# Patient Record
Sex: MALE | Race: WHITE | NOT HISPANIC OR LATINO | Employment: FULL TIME | URBAN - METROPOLITAN AREA
[De-identification: names, ages, dates, MRNs, and addresses within clinical notes are randomized per-mention and may not be internally consistent; named-entity substitution may affect disease eponyms.]

---

## 2017-01-10 ENCOUNTER — OFFICE VISIT (OUTPATIENT)
Dept: FAMILY MEDICINE | Facility: CLINIC | Age: 47
End: 2017-01-10
Payer: COMMERCIAL

## 2017-01-10 VITALS
WEIGHT: 200 LBS | DIASTOLIC BLOOD PRESSURE: 68 MMHG | OXYGEN SATURATION: 99 % | BODY MASS INDEX: 29.62 KG/M2 | RESPIRATION RATE: 18 BRPM | HEART RATE: 82 BPM | HEIGHT: 69 IN | TEMPERATURE: 98.8 F | SYSTOLIC BLOOD PRESSURE: 128 MMHG

## 2017-01-10 DIAGNOSIS — M75.22 BICEPS TENDINITIS OF LEFT SHOULDER: Primary | ICD-10-CM

## 2017-01-10 PROCEDURE — 99213 OFFICE O/P EST LOW 20 MIN: CPT | Performed by: FAMILY MEDICINE

## 2017-01-10 ASSESSMENT — PAIN SCALES - GENERAL: PAINLEVEL: MODERATE PAIN (5)

## 2017-01-10 NOTE — PROGRESS NOTES
"  SUBJECTIVE:                                                    Serafin Hurd is a 46 year old male who presents to clinic today for the following health issues:      Musculoskeletal problem/pain      Duration: 1 week     Description  Location: left shoulder     Intensity:  5 to a 8 /10    Accompanying signs and symptoms: none    History  Previous similar problem: no   Previous evaluation:  Chiropractor     Precipitating or alleviating factors:  Trauma or overuse: no   Aggravating factors include: hanging the arm down     Therapies tried and outcome: rest/inactivity, heat, deep heating rub and chiropractor.        One week ago, he got out of bed and had extreme pain in his left shoulder and down the upper arm. He did go to the chiropractor 3 times, and it has not gotten better, only now it is isolated to the shoulder. The chiropractor recommended he see a physician     No prior injury noted - hurts more at rest then with movement     The first day was the worst, but, since then it has remained the same     ROS:  General: negative, fever, chills    Review of systems assessed and negative except as stated above.    OBJECTIVE:                                                    /68 mmHg  Pulse 82  Temp(Src) 98.8  F (37.1  C)  Resp 18  Ht 5' 9\" (1.753 m)  Wt 200 lb (90.719 kg)  BMI 29.52 kg/m2  SpO2 99%  Body mass index is 29.52 kg/(m^2).  GENERAL APPEARANCE: healthy, alert and no distress  ORTHO:   SHOULDER Exam-Left   Inspection: no swelling, no bruising, no discoloration, no obvious deformity, no asymmetry, no glenohumeral joint anterior bulge, no distal clavicle elevation, no muscle atrophy, no scapular winging   Tenderness of: SC joint- no , clavicle(prox-mid)- no , clavicle-(mid-distal)- no , AC joint- no , acromion- no , anterior capsule- no , prox bicep tendon- YES, greater tuberosity- no , prox humerus- no , supraspinatous- YES, infraspinatous-  superior trapezious- no , rhomboids- no    Range of " Motion: Active- forward flexion- normal, abduction- normal, external rotation- normal, internal rotation- normal  Range of Motion: Passive- forward flexion- normal, abduction- normal, external rotation- normal, internal rotation- normal   Strength: forward flexion- 5/5, abduction- 5/5, internal rotation- 5/5, external rotation- 5/5 and bicep- full   Special tests: [place]ro[place](supraspinatous)- POSITIVE                ASSESSMENT/PLAN:                                                        ICD-10-CM    1. Biceps tendinitis of left shoulder M75.22      PLAN:  1. Aleve 2 pills (about 500 mg) twice a day for 10-14 days  2. If no improvement or getting worse, consider steroid injection and/or physical therapy.    Follow up with Provider - as needed     This document serves as a record of the services and decisions personally performed and made by Tanner Oneal MD.It was created on his behalf by Ana Laura Montejo,  trained medical scribes. The creation of this document is based the provider's statements to the medical scribe. January 10, 2017 10:53 AM    The information in this document, created by the medical scribe for me, accurately reflects the services I personally performed and the decisions made by me. I have reviewed and approved this document for accuracy.     Tanner Oneal MD   [facility]

## 2017-01-10 NOTE — NURSING NOTE
"Chief Complaint   Patient presents with     Shoulder Pain     seen chiropractor x3 not better. Left shoulder, no injury. Thinks he might need steroid.        Initial /68 mmHg  Pulse 82  Temp(Src) 98.8  F (37.1  C)  Resp 18  Ht 5' 9\" (1.753 m)  Wt 200 lb (90.719 kg)  BMI 29.52 kg/m2  SpO2 99% Estimated body mass index is 29.52 kg/(m^2) as calculated from the following:    Height as of this encounter: 5' 9\" (1.753 m).    Weight as of this encounter: 200 lb (90.719 kg).  BP completed using cuff size: regular    "

## 2017-01-10 NOTE — MR AVS SNAPSHOT
"              After Visit Summary   1/10/2017    Serafin Hurd    MRN: [id number]           Patient Information     Date Of Birth          1970        Visit Information        Provider Department      1/10/2017 10:30 AM Tanner Oneal MD [facility]         Follow-ups after your visit        Who to contact     If you have questions or need follow up information about today's clinic visit or your schedule please contact [facility] directly at [phone].  Normal or non-critical lab and imaging results will be communicated to you by MyChart, letter or phone within 4 business days after the clinic has received the results. If you do not hear from us within 7 days, please contact the clinic through [employer] or phone. If you have a critical or abnormal lab result, we will notify you by phone as soon as possible.  Submit refill requests through iodine or call your pharmacy and they will forward the refill request to us. Please allow 3 business days for your refill to be completed.          Additional Information About Your Visit        [facility] Information     iodine lets you send messages to your doctor, view your test results, renew your prescriptions, schedule appointments and more. To sign up, go to [email or web address] . Click on \"Log in\" on the left side of the screen, which will take you to the Welcome page. Then click on \"Sign up Now\" on the right side of the page.     You will be asked to enter the access code listed below, as well as some personal information. Please follow the directions to create your username and password.     Your access code is: [email or web address]  Expires: 2017  9:35 AM     Your access code will  in 90 days. If you need help or a new code, please call your [facility] or [phone].        Care EveryWhere ID     This is your Care EveryWhere ID. This could be used by other organizations to access your [place] medical " "records  [id number]        Your Vitals Were     Pulse Temperature Respirations Height BMI (Body Mass Index) Pulse Oximetry    82 98.8  F (37.1  C) 18 5' 9\" (1.753 m) 29.52 kg/m2 99%       Blood Pressure from Last 3 Encounters:   01/10/17 128/68   11/22/16 124/80   11/08/16 130/80    Weight from Last 3 Encounters:   01/10/17 200 lb (90.719 kg)   11/22/16 192 lb (87.091 kg)   11/08/16 194 lb (87.998 kg)              Today, you had the following     No orders found for display         Today's Medication Changes          These changes are accurate as of: 1/10/17  4:23 PM.  If you have any questions, ask your nurse or doctor.               Stop taking these medicines if you haven't already. Please contact your care team if you have questions.     amitriptyline 10 MG tablet   Commonly known as:  ELAVIL   Stopped by:  Tanner Oneal MD                    Primary Care Provider Office Phone # Fax #    Beto Scott Ramos  [phone]       [address]   [place] [address]        Thank you!     Thank you for choosing [facility]  for your care. Our goal is always to provide you with excellent care. Hearing back from our patients is one way we can continue to improve our services. Please take a few minutes to complete the written survey that you may receive in the mail after your visit with us. Thank you!             Your Updated Medication List - Protect others around you: Learn how to safely use, store and throw away your medicines at www.disposemymeds.org.          This list is accurate as of: 1/10/17  4:23 PM.  Always use your most recent med list.                   Brand Name Dispense Instructions for use    XALATAN 0.005 % ophthalmic solution   Generic drug:  latanoprost      1 DROP EACH EYE DAILY         "

## 2017-01-10 NOTE — Clinical Note
[address]   [address]  Tel. [phone] / Fax [phone]     January 10, 2017    To Whom it may Concern:    Serafin Hurd was seen today in clinic.  He has a severe ankle sprain and will not be allowed to be weight bearing for next 2-3 days and then can be weight bearing and participate in activity as pain allows and as appropriate.  He will have follow-up next week to assess further plan of care.      If you have any further questions, please contact me at the number above.    Sincerely,        Tanner Oneal MD

## 2017-10-17 ENCOUNTER — OFFICE VISIT (OUTPATIENT)
Dept: FAMILY MEDICINE | Facility: OTHER | Age: 47
End: 2017-10-17
Payer: COMMERCIAL

## 2017-10-17 DIAGNOSIS — M79.671 RIGHT FOOT PAIN: Primary | ICD-10-CM

## 2017-10-17 PROCEDURE — 99213 OFFICE O/P EST LOW 20 MIN: CPT | Performed by: INTERNAL MEDICINE

## 2017-10-17 RX ORDER — MELOXICAM 15 MG/1
15 TABLET ORAL DAILY
Qty: 28 TABLET | Refills: 0 | Status: SHIPPED | OUTPATIENT
Start: 2017-10-17 | End: 2018-06-18

## 2017-10-17 ASSESSMENT — PAIN SCALES - GENERAL: PAINLEVEL: MODERATE PAIN (5)

## 2017-10-17 NOTE — NURSING NOTE
"Chief Complaint   Patient presents with     Musculoskeletal Problem     left foot pain x 1 week       Initial /88  Pulse 74  Temp 97.6  F (36.4  C) (Temporal)  Resp 16  Wt 192 lb (87.1 kg)  SpO2 97%  BMI 28.35 kg/m2 Estimated body mass index is 28.35 kg/(m^2) as calculated from the following:    Height as of 1/10/17: 5' 9\" (1.753 m).    Weight as of this encounter: 192 lb (87.1 kg).  Medication Reconciliation: complete  Nathaly Rodrigues CMA (AAMA)    "

## 2017-10-17 NOTE — MR AVS SNAPSHOT
"              After Visit Summary   10/17/2017    Serafin Hurd    MRN: [id number]           Patient Information     Date Of Birth          1970        Visit Information        Provider Department      10/17/2017 2:40 PM Beto Ramos DO [facility]        Today's Diagnoses     Right foot pain    -  1       Follow-ups after your visit        Who to contact     If you have questions or need follow up information about today's clinic visit or your schedule please contact [facility] directly at [phone].  Normal or non-critical lab and imaging results will be communicated to you by [employer]hart, letter or phone within 4 business days after the clinic has received the results. If you do not hear from us within 7 days, please contact the clinic through [employer] or phone. If you have a critical or abnormal lab result, we will notify you by phone as soon as possible.  Submit refill requests through [employer] App or call your pharmacy and they will forward the refill request to us. Please allow 3 business days for your refill to be completed.          Additional Information About Your Visit        MyChart Information     Carista App lets you send messages to your doctor, view your test results, renew your prescriptions, schedule appointments and more. To sign up, go to [email or web address] . Click on \"Log in\" on the left side of the screen, which will take you to the Welcome page. Then click on \"Sign up Now\" on the right side of the page.     You will be asked to enter the access code listed below, as well as some personal information. Please follow the directions to create your username and password.     Your access code is: [email or web address]  Expires: 2018  9:48 AM     Your access code will  in 90 days. If you need help or a new code, please call your [facility] or [phone].        Care EveryWhere ID     This is your Care EveryWhere ID. This could be used by other " organizations to access your [place] medical records  [id number]        Your Vitals Were     Pulse Temperature Respirations Pulse Oximetry BMI (Body Mass Index)       74 97.6  F (36.4  C) (Temporal) 16 97% 28.35 kg/m2        Blood Pressure from Last 3 Encounters:   10/17/17 138/88   01/10/17 128/68   11/22/16 124/80    Weight from Last 3 Encounters:   10/17/17 192 lb (87.1 kg)   01/10/17 200 lb (90.7 kg)   11/22/16 192 lb (87.1 kg)              Today, you had the following     No orders found for display         Today's Medication Changes          These changes are accurate as of: 10/17/17 11:59 PM.  If you have any questions, ask your nurse or doctor.               Start taking these medicines.        Dose/Directions    meloxicam 15 MG tablet   Commonly known as:  MOBIC   Used for:  Right foot pain   Started by:  Beto Ramos DO        Dose:  15 mg   Take 1 tablet (15 mg) by mouth daily   Quantity:  28 tablet   Refills:  0            Where to get your medicines      These medications were sent to [address]  [address], [facility] [address]     Phone:  [phone]     meloxicam 15 MG tablet                Primary Care Provider Office Phone # Fax #    Beto Ramos [phone] [address] [facility]   [facility] [address]        Equal Access to Services     CHUCHO BALDWIN AH: Hadii deja ku hadasho Solissyali, waaxda luqadaha, qaybta kaalmada adeegyada, edwin blue. So [facility] [phone].    ATENCIÓN: Si habla español, tiene a thomason disposición servicios gratuitos de asistencia lingüística. Llame al [phone].    We comply with applicable federal civil rights laws and [place] laws. We do not discriminate on the basis of race, color, national origin, age, disability, sex, sexual orientation, or gender identity.            Thank you!     Thank you for choosing [facility]  for your care. Our goal is  always to provide you with excellent care. Hearing back from our patients is one way we can continue to improve our services. Please take a few minutes to complete the written survey that you may receive in the mail after your visit with us. Thank you!             Your Updated Medication List - Protect others around you: Learn how to safely use, store and throw away your medicines at www.disposemymeds.org.          This list is accurate as of: 10/17/17 11:59 PM.  Always use your most recent med list.                   Brand Name Dispense Instructions for use Diagnosis    meloxicam 15 MG tablet    MOBIC    28 tablet    Take 1 tablet (15 mg) by mouth daily    Right foot pain       XALATAN 0.005 % ophthalmic solution   Generic drug:  latanoprost      1 DROP EACH EYE DAILY

## 2017-10-17 NOTE — PROGRESS NOTES
SUBJECTIVE:   Serafin Hurd is a 46 year old male who presents to clinic today for the following health issues:      Foot Pain    Onset: x 1 week    Description:   Location: left foot  Character: Sharp, Dull ache, Stabbing and Cramping    Intensity: moderate    Progression of Symptoms: better    Accompanying Signs & Symptoms:  Other symptoms: none    History:   Previous similar pain: YES      Precipitating factors:   Trauma or overuse: no     Alleviating factors:  Improved by: sole inserts    Therapies Tried and outcome: sole inserts    Chief complaint: Left foot pain    HPI:  Serafin is a pleasant 46 year old male who presents to the clinic with 7 days history of pain in his left foot. Pain began last Tuesday (10/10/2017) upon awakening. He rated the pain as a 4-5/10 and thought it was a flare up of a fascitis. He switched to wearing athletic/tennis shoes after 24 hours as he thought his dress shoes may have been the cause. 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 Friday (10/13) he had to participate in a high school football game after working the whole day, this caused the pain to go to a 7/10. He said that he had trouble walking on the foot throughout the weekend. He has not tried any OTC analgesic medications or other home remedies. He has been tying his shoes tighter than normal as he feels this has helped his pain.     Past Medical History:   Diagnosis Date     Hypertension      Unspecified glaucoma(365.9) 1998      Past Surgical History:   Procedure Laterality Date     REMOVAL OF SPERM DUCT(S)      Vasectomy      Family History   Problem Relation Age of Onset     Depression Mother      C.A.D. Paternal Uncle       Social History   Substance Use Topics     Smoking status: Never Smoker     Smokeless tobacco: Former User     Alcohol use 0.0 oz/week     0 Standard drinks or equivalent per week      Comment: Social           Review of Systems:    ENT: Pt denies sore  throat, significant nasal congestion, epistaxis, difficulty swallowing, or changes in base-line hearing.    LUNGS: Pt denies cough, excess sputum, hemoptysis, or shortness of breath.   NEURO: Pt denies seizures, strokes, diplopia, weakness, paraesthesias, or paralysis.    SKIN: Pt denies itching, rashes, discoloration, or specific lesions of concern.   PSYCH: Pt denies significant depression, anxiety, mood imbalance. Specifically denies any suicidal ideation.    MS: Pt denies significant joint pain, swelling, or acute loss of function. Able to ambulate with little or no assistance. Pain in left foot for past 7 days.     Examination:  B/P: 138/88 T:97.6 P:74 R:16 [unfilled] 192 lbs 0 oz Data Unavailable     Constitutional: The patient appears to be in no acute distress. The patient appears to be adequately hydrated. No acute respiratory or hemodynamic distress is noted at this time.   ENT: Face is symmetric. Nasal mucosa normal without mass, lesion or bleed.    LUNGS: clear bilaterally. No coughing in noted during visit.    NEURO: PT is alert and appropriate. No neurologic lateralization is noted. Cranial nerves 2-12 are intact. Peripheral sensory and motor function are grossly normal.   PSYCH: The patient appears grossly appropriate. Maintain good eye contact, does not have any jittery or atypical motion. Displays appropriate affect.    SKIN: warm and dry. No erythema, or rashes are noted. No specific lesions of concern are noted.    MS: Minimal crepitance is noted in the extremities. No deformity is present. Muscle strength is appropriate and equal bilaterally. No acute joint erythema or swelling is present. Tenderness to palpation over dorsal surface of 2nd/3rd metatarsal.        Decision Makin. Right foot pain  Serafin works as a  for [employer], this job requires him to be on his feet for extended periods of time. Per his history and physical exam his pain is likely the result of an inflamed  nerve exacerbated by standing. We discussed the low probability of fracture and recommended use of an anti-inflammatory medication. Reviewed mechanism of action and possible adverse effects. Serafin was in agreement with this plan.  - meloxicam (MOBIC) 15 MG tablet; Take 1 tablet (15 mg) by mouth daily  Dispense: 28 tablet; Refill: 0      Follow up:    I have asked the patient to schedule a follow up appointment with me as needed or sooner if conditions worsen, change or fail to improve.     I have carefully explained the diagnosis and treatment options with the patient. The patient has displayed an understanding of the above, and all subsequent questions were answered.     Ean HUERTA

## 2017-10-18 VITALS
SYSTOLIC BLOOD PRESSURE: 138 MMHG | DIASTOLIC BLOOD PRESSURE: 88 MMHG | BODY MASS INDEX: 28.35 KG/M2 | WEIGHT: 192 LBS | OXYGEN SATURATION: 97 % | RESPIRATION RATE: 16 BRPM | TEMPERATURE: 97.6 F | HEART RATE: 74 BPM

## 2018-06-18 ENCOUNTER — OFFICE VISIT (OUTPATIENT)
Dept: FAMILY MEDICINE | Facility: OTHER | Age: 48
End: 2018-06-18
Payer: COMMERCIAL

## 2018-06-18 VITALS
HEART RATE: 80 BPM | BODY MASS INDEX: 28.56 KG/M2 | RESPIRATION RATE: 16 BRPM | TEMPERATURE: 96.3 F | HEIGHT: 69 IN | OXYGEN SATURATION: 99 % | WEIGHT: 192.8 LBS | SYSTOLIC BLOOD PRESSURE: 136 MMHG | DIASTOLIC BLOOD PRESSURE: 80 MMHG

## 2018-06-18 DIAGNOSIS — Z00.00 ENCOUNTER FOR ROUTINE ADULT HEALTH EXAMINATION WITHOUT ABNORMAL FINDINGS: Primary | ICD-10-CM

## 2018-06-18 DIAGNOSIS — I10 HYPERTENSION GOAL BP (BLOOD PRESSURE) < 140/90: ICD-10-CM

## 2018-06-18 LAB
ALBUMIN SERPL-MCNC: 4.2 G/DL (ref 3.4–5)
ALBUMIN UR-MCNC: NEGATIVE MG/DL
ALP SERPL-CCNC: 56 U/L (ref 40–150)
ALT SERPL W P-5'-P-CCNC: 47 U/L (ref 0–70)
ANION GAP SERPL CALCULATED.3IONS-SCNC: 8 MMOL/L (ref 3–14)
APPEARANCE UR: CLEAR
AST SERPL W P-5'-P-CCNC: 31 U/L (ref 0–45)
BILIRUB SERPL-MCNC: 0.7 MG/DL (ref 0.2–1.3)
BILIRUB UR QL STRIP: NEGATIVE
BUN SERPL-MCNC: 10 MG/DL (ref 7–30)
CALCIUM SERPL-MCNC: 8.6 MG/DL (ref 8.5–10.1)
CHLORIDE SERPL-SCNC: 103 MMOL/L (ref 94–109)
CHOLEST SERPL-MCNC: 184 MG/DL
CO2 SERPL-SCNC: 30 MMOL/L (ref 20–32)
COLOR UR AUTO: YELLOW
CREAT SERPL-MCNC: 0.88 MG/DL (ref 0.66–1.25)
ERYTHROCYTE [DISTWIDTH] IN BLOOD BY AUTOMATED COUNT: 12.3 % (ref 10–15)
GFR SERPL CREATININE-BSD FRML MDRD: >90 ML/MIN/1.7M2
GLUCOSE SERPL-MCNC: 106 MG/DL (ref 70–99)
GLUCOSE UR STRIP-MCNC: NEGATIVE MG/DL
HCT VFR BLD AUTO: 44.8 % (ref 40–53)
HDLC SERPL-MCNC: 52 MG/DL
HGB BLD-MCNC: 16.2 G/DL (ref 13.3–17.7)
HGB UR QL STRIP: NEGATIVE
KETONES UR STRIP-MCNC: NEGATIVE MG/DL
LDLC SERPL CALC-MCNC: 89 MG/DL
LEUKOCYTE ESTERASE UR QL STRIP: NEGATIVE
MCH RBC QN AUTO: 32.5 PG (ref 26.5–33)
MCHC RBC AUTO-ENTMCNC: 36.2 G/DL (ref 31.5–36.5)
MCV RBC AUTO: 90 FL (ref 78–100)
NITRATE UR QL: NEGATIVE
NONHDLC SERPL-MCNC: 132 MG/DL
PH UR STRIP: 6.5 PH (ref 5–7)
PLATELET # BLD AUTO: 137 10E9/L (ref 150–450)
POTASSIUM SERPL-SCNC: 4.7 MMOL/L (ref 3.4–5.3)
PROT SERPL-MCNC: 7.5 G/DL (ref 6.8–8.8)
RBC # BLD AUTO: 4.98 10E12/L (ref 4.4–5.9)
SODIUM SERPL-SCNC: 141 MMOL/L (ref 133–144)
SOURCE: NORMAL
SP GR UR STRIP: 1.02 (ref 1–1.03)
TRIGL SERPL-MCNC: 216 MG/DL
UROBILINOGEN UR STRIP-ACNC: 0.2 EU/DL (ref 0.2–1)
WBC # BLD AUTO: 4.4 10E9/L (ref 4–11)

## 2018-06-18 PROCEDURE — 85027 COMPLETE CBC AUTOMATED: CPT | Performed by: INTERNAL MEDICINE

## 2018-06-18 PROCEDURE — 99396 PREV VISIT EST AGE 40-64: CPT | Performed by: INTERNAL MEDICINE

## 2018-06-18 PROCEDURE — 80053 COMPREHEN METABOLIC PANEL: CPT | Performed by: INTERNAL MEDICINE

## 2018-06-18 PROCEDURE — 80061 LIPID PANEL: CPT | Performed by: INTERNAL MEDICINE

## 2018-06-18 PROCEDURE — 81003 URINALYSIS AUTO W/O SCOPE: CPT | Performed by: INTERNAL MEDICINE

## 2018-06-18 PROCEDURE — 36415 COLL VENOUS BLD VENIPUNCTURE: CPT | Performed by: INTERNAL MEDICINE

## 2018-06-18 PROCEDURE — 87389 HIV-1 AG W/HIV-1&-2 AB AG IA: CPT | Performed by: INTERNAL MEDICINE

## 2018-06-18 ASSESSMENT — PAIN SCALES - GENERAL: PAINLEVEL: NO PAIN (1)

## 2018-06-18 NOTE — MR AVS SNAPSHOT
After Visit Summary   6/18/2018    Serafin Hurd    MRN: [id number]           Patient Information     Date Of Birth          1970        Visit Information        Provider Department      6/18/2018 7:00 AM Beto Ramos DO [facility]        Today's Diagnoses     Encounter for routine adult health examination without abnormal findings    -  1    Hypertension goal BP (blood pressure) < 140/90          Care Instructions      Preventive Health Recommendations  Male Ages 40 to 49    Yearly exam:             See your health care provider every year in order to  o   Review health changes.   o   Discuss preventive care.    o   Review your medicines if your doctor has prescribed any.    You should be tested each year for STDs (sexually transmitted diseases) if you re at risk.     Have a cholesterol test every 5 years.     Have a colonoscopy (test for colon cancer) if someone in your family has had colon cancer or polyps before age 50.     After age 45, have a diabetes test (fasting glucose). If you are at risk for diabetes, you should have this test every 3 years.      Talk with your health care provider about whether or not a prostate cancer screening test (PSA) is right for you.    Shots: Get a flu shot each year. Get a tetanus shot every 10 years.     Nutrition:    Eat at least 5 servings of fruits and vegetables daily.     Eat whole-grain bread, whole-wheat pasta and brown rice instead of white grains and rice.     Talk to your provider about Calcium and Vitamin D.     Lifestyle    Exercise for at least 150 minutes a week (30 minutes a day, 5 days a week). This will help you control your weight and prevent disease.     Limit alcohol to one drink per day.     No smoking.     Wear sunscreen to prevent skin cancer.     See your dentist every six months for an exam and cleaning.              Follow-ups after your visit        Follow-up notes from your care team     Return in about  "6 months (around 12/18/2018).      Who to contact     If you have questions or need follow up information about today's clinic visit or your schedule please contact [facility] directly at [phone].  Normal or non-critical lab and imaging results will be communicated to you by MyChart, letter or phone within 4 business days after the clinic has received the results. If you do not hear from us within 7 days, please contact the clinic through MyChart or phone. If you have a critical or abnormal lab result, we will notify you by phone as soon as possible.  Submit refill requests through [employer] or call your pharmacy and they will forward the refill request to us. Please allow 3 business days for your refill to be completed.          Additional Information About Your Visit        Care EveryWhere ID     This is your Care EveryWhere ID. This could be used by other organizations to access your [place] medical records  [id number]        Your Vitals Were     Pulse Temperature Respirations Height Pulse Oximetry BMI (Body Mass Index)    80 96.3  F (35.7  C) (Oral) 16 5' 8.75\" (1.746 m) 99% 28.68 kg/m2       Blood Pressure from Last 3 Encounters:   06/18/18 136/80   10/17/17 138/88   01/10/17 128/68    Weight from Last 3 Encounters:   06/18/18 192 lb 12.8 oz (87.5 kg)   10/17/17 192 lb (87.1 kg)   01/10/17 200 lb (90.7 kg)              We Performed the Following     *UA reflex to Microscopic and Culture ([place] and [facility] (except [place] and [place])     CBC with platelets     Comprehensive metabolic panel     HIV Antigen Antibody Combo     Lipid Profile        Primary Care Provider Office Phone # Fax #    Beto Scott Ramos [phone] [address] [place] [address]        Equal Access to Services     CHUCHO BALDWIN AH: Hadii aad ku hadasho Soomaali, waaxda luqadaha, qaybta kaalmada adeegyada, edwin elise ah. So wa " [phone].    ATENCIÓN: Si hermila swift, tiene a thomason disposición servicios gratuitos de asistencia lingüística. Magui al [phone].    We comply with applicable federal civil rights laws and [place] laws. We do not discriminate on the basis of race, color, national origin, age, disability, sex, sexual orientation, or gender identity.            Thank you!     Thank you for choosing [facility]  for your care. Our goal is always to provide you with excellent care. Hearing back from our patients is one way we can continue to improve our services. Please take a few minutes to complete the written survey that you may receive in the mail after your visit with us. Thank you!             Your Updated Medication List - Protect others around you: Learn how to safely use, store and throw away your medicines at www.disposemymeds.org.          This list is accurate as of 6/18/18  8:05 AM.  Always use your most recent med list.                   Brand Name Dispense Instructions for use Diagnosis    XALATAN 0.005 % ophthalmic solution   Generic drug:  latanoprost      1 DROP EACH EYE DAILY

## 2018-06-18 NOTE — LETTER
June 19, 2018      Serafin Hurd  [address] [address] [place] [id number]        Dear ,    We are writing to inform you of your test results.    Urine sample is normal.   The chemistry panel shows a slightly elevated blood sugar 106.   Liver tests are normal.   The cholesterol is well controlled with an LDL of 89.     Resulted Orders   CBC with platelets   Result Value Ref Range    WBC 4.4 4.0 - 11.0 10e9/L    RBC Count 4.98 4.4 - 5.9 10e12/L    Hemoglobin 16.2 13.3 - 17.7 g/dL    Hematocrit 44.8 40.0 - 53.0 %    MCV 90 78 - 100 fl    MCH 32.5 26.5 - 33.0 pg    MCHC 36.2 31.5 - 36.5 g/dL    RDW 12.3 10.0 - 15.0 %    Platelet Count 137 (L) 150 - 450 10e9/L   Comprehensive metabolic panel   Result Value Ref Range    Sodium 141 133 - 144 mmol/L    Potassium 4.7 3.4 - 5.3 mmol/L    Chloride 103 94 - 109 mmol/L    Carbon Dioxide 30 20 - 32 mmol/L    Anion Gap 8 3 - 14 mmol/L    Glucose 106 (H) 70 - 99 mg/dL      Comment:      Fasting specimen    Urea Nitrogen 10 7 - 30 mg/dL    Creatinine 0.88 0.66 - 1.25 mg/dL    GFR Estimate >90 >60 mL/min/1.7m2      Comment:      Non  GFR Calc    GFR Estimate If Black >90 >60 mL/min/1.7m2      Comment:       GFR Calc    Calcium 8.6 8.5 - 10.1 mg/dL    Bilirubin Total 0.7 0.2 - 1.3 mg/dL    Albumin 4.2 3.4 - 5.0 g/dL    Protein Total 7.5 6.8 - 8.8 g/dL    Alkaline Phosphatase 56 40 - 150 U/L    ALT 47 0 - 70 U/L    AST 31 0 - 45 U/L   Lipid Profile   Result Value Ref Range    Cholesterol 184 <200 mg/dL    Triglycerides 216 (H) <150 mg/dL      Comment:      Borderline high:  150-199 mg/dl  High:             200-499 mg/dl  Very high:       >499 mg/dl  Fasting specimen      HDL Cholesterol 52 >39 mg/dL    LDL Cholesterol Calculated 89 <100 mg/dL      Comment:      Desirable:       <100 mg/dl    Non HDL Cholesterol 132 (H) <130 mg/dL      Comment:      Above Desirable:  130-159 mg/dl  Borderline high:  160-189 mg/dl  High:             190-219  mg/dl  Very high:       >219 mg/dl     *UA reflex to Microscopic and Culture ([facility] (except [place] and [place])   Result Value Ref Range    Color Urine Yellow     Appearance Urine Clear     Glucose Urine Negative NEG^Negative mg/dL    Bilirubin Urine Negative NEG^Negative    Ketones Urine Negative NEG^Negative mg/dL    Specific Gravity Urine 1.020 1.003 - 1.035    Blood Urine Negative NEG^Negative    pH Urine 6.5 5.0 - 7.0 pH    Protein Albumin Urine Negative NEG^Negative mg/dL    Urobilinogen Urine 0.2 0.2 - 1.0 EU/dL    Nitrite Urine Negative NEG^Negative    Leukocyte Esterase Urine Negative NEG^Negative    Source Unspecified Urine        If you have any questions or concerns, please call the clinic at the number listed above.       Sincerely,        Beto Ramos, DO

## 2018-06-18 NOTE — PROGRESS NOTES
SUBJECTIVE:   CC: Serafin Hurd is an 47 year old male who presents for preventative health visit.     Physical   Annual:     Getting at least 3 servings of Calcium per day::  Yes    Bi-annual eye exam::  Yes    Dental care twice a year::  Yes    Sleep apnea or symptoms of sleep apnea::  None    Diet::  Regular (no restrictions)    Frequency of exercise::  1 day/week    Duration of exercise::  15-30 minutes    Taking medications regularly::  Yes    Medication side effects::  None    Additional concerns today::  YES                    Today's PHQ-2 Score:   PHQ-2 ( 1999 [employer]) 6/18/2018   Q1: Little interest or pleasure in doing things 0   Q2: Feeling down, depressed or hopeless 0   PHQ-2 Score 0   Q1: Little interest or pleasure in doing things Not at all   Q2: Feeling down, depressed or hopeless Not at all   PHQ-2 Score 0       Abuse: Current or Past(Physical, Sexual or Emotional)- No  Do you feel safe in your environment - Yes    Social History   Substance Use Topics     Smoking status: Never Smoker     Smokeless tobacco: Former User     Alcohol use 0.0 oz/week     0 Standard drinks or equivalent per week      Comment: Social     Alcohol Use 6/18/2018   If you drink alcohol do you typically have greater than 3 drinks per day OR greater than 7 drinks per week? Yes   AUDIT SCORE  8     AUDIT - Alcohol Use Disorders Identification Test - Reproduced from the World Health Organization Audit 2001 (Second Edition) 6/18/2018   1.  How often do you have a drink containing alcohol? 4 or more times a week   2.  How many drinks containing alcohol do you have on a typical day when you are drinking? 3 or 4   3.  How often do you have five or more drinks on one occasion? Weekly   4.  How often during the last year have you found that you were not able to stop drinking once you had started? Never   5.  How often during the last year have you failed to do what was normally expected of you because of drinking? Never   6.  How often  during the last year have you needed a first drink in the morning to get yourself going after a heavy drinking session? Never   7.  How often during the last year have you had a feeling of guilt or remorse after drinking? Never   8.  How often during the last year have you been unable to remember what happened the night before because of your drinking? Never   9.  Have you or someone else been injured because of your drinking? No   10. Has a relative, friend, doctor or other health care worker been concerned about your drinking or suggested you cut down? No   TOTAL SCORE 8       Last PSA: No results found for: PSA    Reviewed orders with patient. Reviewed health maintenance and updated orders accordingly - Yes  Labs reviewed in EPIC  BP Readings from Last 3 Encounters:   06/18/18 136/80   10/17/17 138/88   01/10/17 128/68    Wt Readings from Last 3 Encounters:   06/18/18 192 lb 12.8 oz (87.5 kg)   10/17/17 192 lb (87.1 kg)   01/10/17 200 lb (90.7 kg)                  Patient Active Problem List   Diagnosis     Deviated nasal septum     CARDIOVASCULAR SCREENING; LDL GOAL LESS THAN 160     Hypertension goal BP (blood pressure) < 140/90     Iliotibial band syndrome - bilateral     Past Surgical History:   Procedure Laterality Date     REMOVAL OF SPERM DUCT(S)      Vasectomy       Social History   Substance Use Topics     Smoking status: Never Smoker     Smokeless tobacco: Former User     Alcohol use 0.0 oz/week     0 Standard drinks or equivalent per week      Comment: Social     Family History   Problem Relation Age of Onset     Depression Mother      C.A.D. Paternal Uncle          Current Outpatient Prescriptions   Medication Sig Dispense Refill     XALATAN SOLN 0.005 % OP 1 DROP EACH EYE DAILY       Allergies   Allergen Reactions     No Known Drug Allergies        Reviewed and updated as needed this visit by clinical staff  Tobacco  Allergies  Meds  Med Hx  Surg Hx  Fam Hx  Soc Hx        Reviewed and updated  "as needed this visit by Provider            Review of Systems  CONSTITUTIONAL: NEGATIVE for fever, chills, change in weight  INTEGUMENTARY/SKIN: NEGATIVE for worrisome rashes, moles or lesions  EYES: NEGATIVE for vision changes or irritation  ENT: NEGATIVE for ear, mouth and throat problems  RESP: NEGATIVE for significant cough or SOB  CV: NEGATIVE for chest pain, palpitations or peripheral edema  GI: NEGATIVE for nausea, abdominal pain, heartburn, or change in bowel habits   male: negative for dysuria, hematuria, decreased urinary stream, erectile dysfunction, urethral discharge  MUSCULOSKELETAL: Patient does have some occasional discomfort in the left elbow.  It is at the olecranon bursa.  He does play a great deal of golf.  He is recently had his left shoulder injected and had a \"displaced\" right hip which spontaneously reduced  NEURO: NEGATIVE for weakness, dizziness or paresthesias  PSYCHIATRIC: NEGATIVE for changes in mood or affect    OBJECTIVE:   /80 (BP Location: Left arm, Patient Position: Chair, Cuff Size: Adult Large)  Pulse 80  Temp 96.3  F (35.7  C) (Oral)  Resp 16  Ht 5' 8.75\" (1.746 m)  Wt 192 lb 12.8 oz (87.5 kg)  SpO2 99%  BMI 28.68 kg/m2    Physical Exam  GENERAL: healthy, alert and no distress  EYES: Eyes grossly normal to inspection, PERRL and conjunctivae and sclerae normal  HENT: ear canals and TM's normal, nose and mouth without ulcers or lesions  NECK: no adenopathy, no asymmetry, masses, or scars and thyroid normal to palpation  RESP: lungs clear to auscultation - no rales, rhonchi or wheezes  CV: regular rate and rhythm, normal S1 S2, no S3 or S4, no murmur, click or rub, no peripheral edema and peripheral pulses strong  ABDOMEN: soft, nontender, no hepatosplenomegaly, no masses and bowel sounds normal  MS: Patient does have a very clean T elbow on the left.  No acute inflammation is noted.  Evidence of a chronic olecranon bursitis is present but it is very minimal.  SKIN: " "no suspicious lesions or rashes  NEURO: Normal strength and tone, mentation intact and speech normal  PSYCH: mentation appears normal, affect normal/bright    ASSESSMENT/PLAN:       ICD-10-CM    1. Encounter for routine adult health examination without abnormal findings Z00.00 CBC with platelets     Comprehensive metabolic panel     Lipid Profile     HIV Antigen Antibody Combo     *UA reflex to Microscopic and Culture ([facility] (except [place] and [place])     CANCELED: UA reflex to Microscopic and Culture   2. Hypertension goal BP (blood pressure) < 140/90 I10        COUNSELING:   Reviewed preventive health counseling, as reflected in patient instructions       Regular exercise       Healthy diet/nutrition       Vision screening       Hearing screening       Aspirin Prophylaxsis       HIV screeninx in teen years, 1x in adult years, and at intervals if high risk       Prostate cancer screening         reports that he has never smoked. He has quit using smokeless tobacco.    Estimated body mass index is 28.68 kg/(m^2) as calculated from the following:    Height as of this encounter: 5' 8.75\" (1.746 m).    Weight as of this encounter: 192 lb 12.8 oz (87.5 kg).       Counseling Resources:  ATP IV Guidelines  Pooled Cohorts Equation Calculator  FRAX Risk Assessment  ICSI Preventive Guidelines  Dietary Guidelines for Americans,   USDA's MyPlate  ASA Prophylaxis  Lung CA Screening    Beto Ramos,   [facility]  Answers for HPI/ROS submitted by the patient on 2018   PHQ-2 Score: 0    "

## 2018-06-18 NOTE — LETTER
June 27, 2018      Serafin Hurd  [address] [address] [place] [id number]        Dear ,    We are writing to inform you of your test results.    HIV screening is negative.     Resulted Orders   CBC with platelets   Result Value Ref Range    WBC 4.4 4.0 - 11.0 10e9/L    RBC Count 4.98 4.4 - 5.9 10e12/L    Hemoglobin 16.2 13.3 - 17.7 g/dL    Hematocrit 44.8 40.0 - 53.0 %    MCV 90 78 - 100 fl    MCH 32.5 26.5 - 33.0 pg    MCHC 36.2 31.5 - 36.5 g/dL    RDW 12.3 10.0 - 15.0 %    Platelet Count 137 (L) 150 - 450 10e9/L   Comprehensive metabolic panel   Result Value Ref Range    Sodium 141 133 - 144 mmol/L    Potassium 4.7 3.4 - 5.3 mmol/L    Chloride 103 94 - 109 mmol/L    Carbon Dioxide 30 20 - 32 mmol/L    Anion Gap 8 3 - 14 mmol/L    Glucose 106 (H) 70 - 99 mg/dL      Comment:      Fasting specimen    Urea Nitrogen 10 7 - 30 mg/dL    Creatinine 0.88 0.66 - 1.25 mg/dL    GFR Estimate >90 >60 mL/min/1.7m2      Comment:      Non  GFR Calc    GFR Estimate If Black >90 >60 mL/min/1.7m2      Comment:       GFR Calc    Calcium 8.6 8.5 - 10.1 mg/dL    Bilirubin Total 0.7 0.2 - 1.3 mg/dL    Albumin 4.2 3.4 - 5.0 g/dL    Protein Total 7.5 6.8 - 8.8 g/dL    Alkaline Phosphatase 56 40 - 150 U/L    ALT 47 0 - 70 U/L    AST 31 0 - 45 U/L   Lipid Profile   Result Value Ref Range    Cholesterol 184 <200 mg/dL    Triglycerides 216 (H) <150 mg/dL      Comment:      Borderline high:  150-199 mg/dl  High:             200-499 mg/dl  Very high:       >499 mg/dl  Fasting specimen      HDL Cholesterol 52 >39 mg/dL    LDL Cholesterol Calculated 89 <100 mg/dL      Comment:      Desirable:       <100 mg/dl    Non HDL Cholesterol 132 (H) <130 mg/dL      Comment:      Above Desirable:  130-159 mg/dl  Borderline high:  160-189 mg/dl  High:             190-219 mg/dl  Very high:       >219 mg/dl     HIV Antigen Antibody Combo   Result Value Ref Range    HIV Antigen Antibody Combo Nonreactive NR^Nonreactive           Comment:      HIV-1 p24 Ag & HIV-1/HIV-2 Ab Not Detected   *UA reflex to Microscopic and Culture ([facility] (except [place] and [place])   Result Value Ref Range    Color Urine Yellow     Appearance Urine Clear     Glucose Urine Negative NEG^Negative mg/dL    Bilirubin Urine Negative NEG^Negative    Ketones Urine Negative NEG^Negative mg/dL    Specific Gravity Urine 1.020 1.003 - 1.035    Blood Urine Negative NEG^Negative    pH Urine 6.5 5.0 - 7.0 pH    Protein Albumin Urine Negative NEG^Negative mg/dL    Urobilinogen Urine 0.2 0.2 - 1.0 EU/dL    Nitrite Urine Negative NEG^Negative    Leukocyte Esterase Urine Negative NEG^Negative    Source Unspecified Urine      Sincerely,      Beto Ramos, DO

## 2018-06-19 LAB — HIV 1+2 AB+HIV1 P24 AG SERPL QL IA: NONREACTIVE

## 2018-06-19 NOTE — PROGRESS NOTES
Please contact the patient and notify him of the following:    The urine sample is normal.  The chemistry panel shows a slightly elevated blood sugar 106.  Liver tests are normal.  The cholesterol is well controlled with an LDL of 89.  Thank you.   DO INDU Nguyen

## 2018-06-27 NOTE — PROGRESS NOTES
Please contact the patient and notify him of the following:  HIV screening is negative.      Thank you.   DO INDU Nguyen

## 2018-10-22 ENCOUNTER — ALLIED HEALTH/NURSE VISIT (OUTPATIENT)
Dept: FAMILY MEDICINE | Facility: OTHER | Age: 48
End: 2018-10-22
Payer: COMMERCIAL

## 2018-10-22 ENCOUNTER — TELEPHONE (OUTPATIENT)
Dept: FAMILY MEDICINE | Facility: OTHER | Age: 48
End: 2018-10-22

## 2018-10-22 DIAGNOSIS — M70.22 OLECRANON BURSITIS OF LEFT ELBOW: Primary | ICD-10-CM

## 2018-10-22 PROCEDURE — 99207 ZZC NO CHARGE NURSE ONLY: CPT

## 2018-10-22 RX ORDER — PREDNISONE 20 MG/1
60 TABLET ORAL DAILY
Qty: 15 TABLET | Refills: 0 | Status: SHIPPED | OUTPATIENT
Start: 2018-10-22 | End: 2019-06-21

## 2018-10-22 NOTE — TELEPHONE ENCOUNTER
Reason for Call:  Same Day Appointment, Requested Provider:  Beto Ramos DO    PCP: Beto Ramos    Reason for visit: elbow and arm swelling  Duration of symptoms: x1 week    Have you been treated for this in the past? No    Additional comments: no openings - patient is not sure if it is hot.  No accident or injury and is wondering if it is an infection    Can we leave a detailed message on this number? YES    Phone number patient can be reached at: Cell number on file:    Telephone Information:   Mobile [phone]       Best Time:     Call taken on 10/22/2018 at 9:07 AM by Jessika Givens

## 2018-10-22 NOTE — PROGRESS NOTES
Patient presents to the clinic with concerns of a swollen left elbow. He said Tuesday the elbow seemed painful. Wednesday through Saturday the elbow was really swollen. Patient said he woke up Sunday and most of the swelling had traveled down to his hand. Patient said he has some decreased range of motion in the elbow. No signs of infection. Patient said the swelling in his hand is half the amount it was yesterday.     Dr. Ramos came and assessed the patient. He would like to start the patient on some Prednisone to help with the swelling. He would like patient to follow up if no improvement or any signs of infection. Patient understands and agrees with the plan of care.     MUKESH GuzmanN, RN  [facility]

## 2018-10-22 NOTE — MR AVS SNAPSHOT
"              After Visit Summary   10/22/2018    Serafin Hurd    MRN: [id number]           Patient Information     Date Of Birth          1970        Visit Information        Provider Department      10/22/2018 2:30 PM  NURSE [facility]        Today's Diagnoses     Olecranon bursitis of left elbow    -  1       Follow-ups after your visit        Who to contact     If you have questions or need follow up information about today's clinic visit or your schedule please contact [facility] directly at [phone].  Normal or non-critical lab and imaging results will be communicated to you by MyChart, letter or phone within 4 business days after the clinic has received the results. If you do not hear from us within 7 days, please contact the clinic through [employer] or phone. If you have a critical or abnormal lab result, we will notify you by phone as soon as possible.  Submit refill requests through [facility] or call your pharmacy and they will forward the refill request to us. Please allow 3 business days for your refill to be completed.          Additional Information About Your Visit        MyChart Information     [facility] lets you send messages to your doctor, view your test results, renew your prescriptions, schedule appointments and more. To sign up, go to [email or web address] . Click on \"Log in\" on the left side of the screen, which will take you to the Welcome page. Then click on \"Sign up Now\" on the right side of the page.     You will be asked to enter the access code listed below, as well as some personal information. Please follow the directions to create your username and password.     Your access code is: [email or web address]  Expires: 2019  2:17 PM     Your access code will  in 90 days. If you need help or a new code, please call your [facility] or [phone].        Care EveryWhere ID     This is your Care EveryWhere ID. This could be used by other organizations " to access your [facility] medical records  [id number]         Blood Pressure from Last 3 Encounters:   06/18/18 136/80   10/17/17 138/88   01/10/17 128/68    Weight from Last 3 Encounters:   06/18/18 192 lb 12.8 oz (87.5 kg)   10/17/17 192 lb (87.1 kg)   01/10/17 200 lb (90.7 kg)              Today, you had the following     No orders found for display         Today's Medication Changes          These changes are accurate as of 10/22/18  3:33 PM.  If you have any questions, ask your nurse or doctor.               Start taking these medicines.        Dose/Directions    predniSONE 20 MG tablet   Commonly known as:  DELTASONE   Used for:  Olecranon bursitis of left elbow        Dose:  60 mg   Take 3 tablets (60 mg) by mouth daily   Quantity:  15 tablet   Refills:  0            Where to get your medicines      These medications were sent to [address]  [address], [facility] [address]     Phone:  [phone]     predniSONE 20 MG tablet                Primary Care Provider Office Phone # Fax #    Betoshukri Aragonjudy,  [phone]       [address] [facility]   [facility] [address]        Equal Access to Services     CHUCHO [place]DWIN AH: Hadii deja salazar hadasho Soomaali, waaxda luqadaha, qaybta kaalmada adeegyada, waxay jorge blue. So [facility] [phone].    ATENCIÓN: Si habla español, tiene a thomason disposición servicios gratuitos de asistencia lingüística. Llame al [phone].    We comply with applicable federal civil rights laws and [place] laws. We do not discriminate on the basis of race, color, national origin, age, disability, sex, sexual orientation, or gender identity.            Thank you!     Thank you for choosing [facility]  for your care. Our goal is always to provide you with excellent care. Hearing back from our patients is one way we can continue to improve our services. Please take a few minutes to complete the written  survey that you may receive in the mail after your visit with us. Thank you!             Your Updated Medication List - Protect others around you: Learn how to safely use, store and throw away your medicines at www.disposemymeds.org.          This list is accurate as of 10/22/18  3:33 PM.  Always use your most recent med list.                   Brand Name Dispense Instructions for use Diagnosis    predniSONE 20 MG tablet    DELTASONE    15 tablet    Take 3 tablets (60 mg) by mouth daily    Olecranon bursitis of left elbow       XALATAN 0.005 % ophthalmic solution   Generic drug:  latanoprost      1 DROP EACH EYE DAILY

## 2019-05-08 ENCOUNTER — TELEPHONE (OUTPATIENT)
Dept: FAMILY MEDICINE | Facility: OTHER | Age: 49
End: 2019-05-08

## 2019-05-08 NOTE — TELEPHONE ENCOUNTER
Panel Management Review      Patient has the following on his problem list:     Hypertension   Last three blood pressure readings:  BP Readings from Last 3 Encounters:   06/18/18 136/80   10/17/17 138/88   01/10/17 128/68     Blood pressure: Passed    HTN Guidelines:  Less than 140/90      Composite cancer screening  Chart review shows that this patient is due/due soon for the following Colonoscopy  Summary:    Patient is due/failing the following:   COLONOSCOPY    Action needed:   Patient needs referral/order: Colonoscopy    Type of outreach:    Sent [employer] message.    Questions for provider review:    None                                                                                                                                    Coleen Abraham MA

## 2019-06-10 ENCOUNTER — TELEPHONE (OUTPATIENT)
Dept: FAMILY MEDICINE | Facility: OTHER | Age: 49
End: 2019-06-10

## 2019-06-10 DIAGNOSIS — N50.89 TESTICULAR SWELLING: Primary | ICD-10-CM

## 2019-06-10 NOTE — TELEPHONE ENCOUNTER
Called and spoke to the patient. He said he has a swollen testicle and thinking he would like it checked out. Has been swollen a month or two. He said it is 2-3 times larger than the other testicle. No pain. Not getting any better. Unsure if things are worse. No fever, nausea, vomiting, black/blue, bright red or injury.     Patient is wondering if PCP can work him in today. RN did advise his schedule was full and he is out of clinic the rest of the week. He is wondering if PCP thinks he is OK to be seen later this week if PCP can't work him in or if he should go to the ED.     Will route to provider to advise.     MUKESH GuzmanN, RN  [facility]

## 2019-06-10 NOTE — TELEPHONE ENCOUNTER
Given the chronicity of his concern, I do not believe the emergency department is necessary.  I have placed an order for a testicular ultrasound.  If you could help the patient set this up, I would like to see him next week.  We will have the results by then be able to make a better determination of what needs to be done.    Obviously, if his symptoms were to worsen in the meantime, he should then go to the emergency department.    Richard

## 2019-06-10 NOTE — TELEPHONE ENCOUNTER
Reason for call:  Patient reporting a symptom    Symptom or request: swollen testicle    Duration (how long have symptoms been present): a few days    Have you been treated for this before? No    Additional comments: states no pain, no trouble with urination. Please advise.     Phone Number patient can be reached at:  Cell number on file:    Telephone Information:   Mobile [phone]       Best Time:  anytime    Can we leave a detailed message on this number:  YES    Call taken on 6/10/2019 at 11:43 AM by Renea Childs

## 2019-06-11 NOTE — TELEPHONE ENCOUNTER
Patient called back. Message relayed. Appointment scheduled with PCP and patient transferred to Radiology to schedule Ultrasound.

## 2019-06-12 ENCOUNTER — HOSPITAL ENCOUNTER (OUTPATIENT)
Dept: ULTRASOUND IMAGING | Facility: CLINIC | Age: 49
Discharge: HOME | End: 2019-06-12
Attending: INTERNAL MEDICINE | Admitting: INTERNAL MEDICINE
Payer: COMMERCIAL

## 2019-06-12 DIAGNOSIS — N50.89 TESTICULAR SWELLING: ICD-10-CM

## 2019-06-12 PROCEDURE — 93976 VASCULAR STUDY: CPT

## 2019-06-21 ENCOUNTER — OFFICE VISIT (OUTPATIENT)
Dept: FAMILY MEDICINE | Facility: OTHER | Age: 49
End: 2019-06-21
Payer: COMMERCIAL

## 2019-06-21 VITALS
BODY MASS INDEX: 30.35 KG/M2 | OXYGEN SATURATION: 96 % | SYSTOLIC BLOOD PRESSURE: 160 MMHG | HEART RATE: 94 BPM | RESPIRATION RATE: 16 BRPM | DIASTOLIC BLOOD PRESSURE: 108 MMHG | WEIGHT: 204 LBS | TEMPERATURE: 99.1 F

## 2019-06-21 DIAGNOSIS — N43.3 HYDROCELE, UNSPECIFIED HYDROCELE TYPE: Primary | ICD-10-CM

## 2019-06-21 PROCEDURE — 99213 OFFICE O/P EST LOW 20 MIN: CPT | Performed by: INTERNAL MEDICINE

## 2019-06-21 ASSESSMENT — PAIN SCALES - GENERAL: PAINLEVEL: NO PAIN (0)

## 2019-06-21 NOTE — PROGRESS NOTES
Subjective     Serafin Hurd is a 48 year old male who presents to clinic today for the following health issues:    HPI   Chief Complaint   Patient presents with     Follow Up     ultrasound results                          Chief Complaint         The patient is a pleasant 48-year-old gentleman who has been having some testicular pain and swelling.  Recent imaging studies confirm moderate to large sized bilateral hydroceles.  Otherwise, testicular examination is negative for torsion, mass etc.  We discussed treatment options including conservative and surgical.  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  He would like to pursue surgical intervention prior to returning to school.    His review of systems, history, examination are entirely unchanged from previous.                                               Decision Making    1. Hydrocele, unspecified hydrocele type  We will set up with urology for surgical opinion and therapy as indicated   - UROLOGY ADULT REFERRAL                      FOLLOW UP   I have asked the patient to make an appointment for followup with me preoperatively if requested        I have carefully explained the diagnosis and treatment options to the patient.  The patient has displayed an understanding of the above, and all subsequent questions were answered.      DO INDU Nguyen    Portions of this note were produced using [employer]  Although every attempt at real-time proof reading has been made, occasional grammar/syntax errors may have been missed.

## 2019-06-25 ENCOUNTER — OFFICE VISIT (OUTPATIENT)
Dept: FAMILY MEDICINE | Facility: OTHER | Age: 49
End: 2019-06-25
Payer: COMMERCIAL

## 2019-06-25 VITALS
HEART RATE: 72 BPM | BODY MASS INDEX: 29.49 KG/M2 | OXYGEN SATURATION: 97 % | SYSTOLIC BLOOD PRESSURE: 146 MMHG | WEIGHT: 206 LBS | HEIGHT: 70 IN | DIASTOLIC BLOOD PRESSURE: 92 MMHG | TEMPERATURE: 97.6 F | RESPIRATION RATE: 16 BRPM

## 2019-06-25 DIAGNOSIS — Z53.9 ERRONEOUS ENCOUNTER--DISREGARD: Primary | ICD-10-CM

## 2019-06-25 ASSESSMENT — ENCOUNTER SYMPTOMS
WEAKNESS: 0
COUGH: 0
EYE PAIN: 0
FREQUENCY: 0
DIZZINESS: 0
DIARRHEA: 0
ABDOMINAL PAIN: 0
NERVOUS/ANXIOUS: 0
MYALGIAS: 0
NAUSEA: 0
FEVER: 0
ARTHRALGIAS: 0
HEARTBURN: 0
DYSURIA: 0
HEADACHES: 0
PARESTHESIAS: 0
CONSTIPATION: 0
HEMATURIA: 0
PALPITATIONS: 0
CHILLS: 0
HEMATOCHEZIA: 0
JOINT SWELLING: 0
SORE THROAT: 0
SHORTNESS OF BREATH: 0

## 2019-06-25 ASSESSMENT — PAIN SCALES - GENERAL: PAINLEVEL: NO PAIN (0)

## 2019-06-25 ASSESSMENT — MIFFLIN-ST. JEOR: SCORE: 1799.41

## 2019-06-25 NOTE — PROGRESS NOTES
SUBJECTIVE:       The patient was seen 4 days ago with a hydrocele.  He has an appointment to see urology in 3 weeks.  Anticipate he will have surgery thereafter.  I anticipate he will need a preop examination.  I anticipate this examination will be outside of the required 30 days.  Therefore, we will not do the examination today but rather put it off until prior to his surgery.  It that way we are not charging this gentleman for 2 physicals within 1 month.    For this reason, there is no examination today nor charge for this visit.    Richard

## 2019-07-17 ENCOUNTER — TELEPHONE (OUTPATIENT)
Dept: UROLOGY | Facility: OTHER | Age: 49
End: 2019-07-17

## 2019-07-17 ENCOUNTER — OFFICE VISIT (OUTPATIENT)
Dept: UROLOGY | Facility: OTHER | Age: 49
End: 2019-07-17
Payer: COMMERCIAL

## 2019-07-17 VITALS
RESPIRATION RATE: 16 BRPM | DIASTOLIC BLOOD PRESSURE: 105 MMHG | OXYGEN SATURATION: 98 % | TEMPERATURE: 98.4 F | HEART RATE: 72 BPM | SYSTOLIC BLOOD PRESSURE: 196 MMHG

## 2019-07-17 DIAGNOSIS — I10 HYPERTENSION GOAL BP (BLOOD PRESSURE) < 140/90: ICD-10-CM

## 2019-07-17 DIAGNOSIS — N43.3 HYDROCELE IN ADULT: Primary | ICD-10-CM

## 2019-07-17 PROCEDURE — 99203 OFFICE O/P NEW LOW 30 MIN: CPT | Performed by: UROLOGY

## 2019-07-17 ASSESSMENT — PAIN SCALES - GENERAL: PAINLEVEL: NO PAIN (0)

## 2019-07-17 NOTE — PATIENT INSTRUCTIONS
Patient Education     Hydrocele Surgery (Hydrocelectomy)    A hydrocele is a sac of fluid that forms around a testicle. It occurs when fluid builds up in the layer of tissue that covers the testicle. It may be caused by an infection or by injury to the testicle. But the cause is often not known. A large hydrocele can cause pain or swelling in the scrotum. Hydrocelectomy is surgery to remove the hydrocele.  Preparing for surgery  Prepare for the surgery as you have been told. In addition:    Tell your doctor about all medicines you take. This includes both prescription and over-the-counter medicines. This also includes herbs and other supplements. It also includes any blood thinners, such as warfarin, clopidogrel, or daily aspirin. You may need to stop taking some or all of them before surgery as instructed by your doctor.    Follow any directions you are given for not eating or drinking before surgery.  The day of surgery  The procedure takes about 30 minutes. You will likely go home on the same day.  Before the surgery starts    An IV (intravenous) line is put into a vein in your arm or hand. This line delivers fluids and  medicine (such as antibiotics).    You are then given medicine (anesthesia) to keep you free of pain during the surgery. This may be general anesthesia, which puts you in a state like deep sleep through the surgery. A tube may be put into your throat to help you breathe.    Local anesthesia or numbing medicine may be given to help control post-surgery pain. The doctor or anesthesiologist can tell you more.  During the surgery    An incision is made in the scrotum.    The hydrocele is drained of fluid. The tissue that forms the sac around the hydrocele is removed or repositioned. This helps prevent fluid from building up again.    A thin tube (drain) may be placed in the incision to allow fluid to drain.    The incision in the scrotum is closed with stitches or surgical strips.  After the  surgery  You will be taken to a room to recover from the anesthesia. A nurse will monitor you and make sure you re not in pain. You may feel sleepy and nauseated. If a breathing tube was used, your throat may be sore at first. An ice pack may be put on the surgical area. This helps reduce swelling. You may also be given a jockstrap to wear. This helps relieve pain and swelling, and prevents injury. Once you are ready to go home, have an adult family member or friend drive you.  Recovering at home  Follow the instructions you have been given to care for yourself. During your recovery:    Apply an ice pack or cold compress to the scrotum as directed to help reduce swelling. Do this for no longer than 15 minutes at a time. Continue using the cold pack for 2 days or until swelling improves.    Take prescribed pain medicine as directed.    Care for your incision as instructed.    Follow your doctor s guidelines for showering. Avoid swimming, bathing, using a hot tub, and other activities that cause the incision to be covered with water until your doctor says it s OK.    Wear a jockstrap or snug underwear as directed.    Don't lift anything heavy. Exercise as directed.    Don't have sex for 4 weeks, or as directed.    Don't drive until you are no longer taking pain medicine and your doctor says it s OK.  When to call your healthcare provider  Call your healthcare provider if you have any of the following:    Chest pain or trouble breathing (call 911)    Fever of 100.4 F (38 C) or higher, or as directed by your healthcare provider    Symptoms of infection at the incision site such as increased redness or swelling, warmth, worsening pain, or foul-smelling drainage    Bleeding from the incision site    Pain gets worse or is not relieved by pain medicine    Increased pain or swelling in the scrotum or groin area   Follow-up care  You will have follow-up visits with your doctor to check on your healing. You may also have  stitches or a surgical drain that needs to be removed. Be sure to tell your doctor if you have any questions or concerns about your recovery.  Risks and possible complications    Bleeding    Infection    Blood clots    Recurrence of the hydrocele    Injury to the testicle and nearby structures, which can lead to infertility    Risks of anesthesia. The anesthesiologist will discuss these with you.    Date Last Reviewed: 8/1/2017 2000-2018 [employer]. [address] [place] [address], [place] [id number]. All rights reserved. This information is not intended as a substitute for professional medical care. Always follow your healthcare professional's instructions.

## 2019-07-17 NOTE — PROGRESS NOTES
S: Patient is a pleasant 48-year-old  male who is request to be seen by Dr. Ramos for a consultation with regard to patient's scrotal swelling.  This has been gone for several months and patient think is getting worse.  Recent scrotal ultrasound show bilateral hydroceles.  There are no intratesticular masses.  Patient has no pain or discomfort.  Current Outpatient Medications   Medication Sig Dispense Refill     XALATAN SOLN 0.005 % OP 1 DROP EACH EYE DAILY       Allergies   Allergen Reactions     No Known Drug Allergies      Past Medical History:   Diagnosis Date     Hypertension      Unspecified glaucoma(365.9) 1998     Past Surgical History:   Procedure Laterality Date     REMOVAL OF SPERM DUCT(S)      Vasectomy      Family History   Problem Relation Age of Onset     Depression Mother      C.A.D. Paternal Uncle      Social History     Socioeconomic History     Marital status:      Spouse name: None     Number of children: None     Years of education: None     Highest education level: None   Occupational History     None   Social Needs     Financial resource strain: None     Food insecurity:     Worry: None     Inability: None     Transportation needs:     Medical: None     Non-medical: None   Tobacco Use     Smoking status: Never Smoker     Smokeless tobacco: Former User   Substance and Sexual Activity     Alcohol use: Yes     Alcohol/week: 0.0 oz     Comment: Social     Drug use: No     Sexual activity: Yes     Partners: Female     Birth control/protection: Surgical   Lifestyle     Physical activity:     Days per week: None     Minutes per session: None     Stress: None   Relationships     Social connections:     Talks on phone: None     Gets together: None     Attends L[employer] day service: None     Active member of club or organization: None     Attends meetings of clubs or organizations: None     Relationship status: None     Intimate partner violence:     Fear of current or ex partner: None      Emotionally abused: None     Physically abused: None     Forced sexual activity: None   Other Topics Concern     Parent/sibling w/ CABG, MI or angioplasty before 65F 55M? No   Social History Narrative     None       REVIEW OF SYSTEMS  =================  C: NEGATIVE for fever, chills, change in weight  I: NEGATIVE for worrisome rashes, moles or lesions  E/M: NEGATIVE for ear, mouth and throat problems  R: NEGATIVE for significant cough or SHORTNESS OF BREATH  CV:  NEGATIVE for chest pain, palpitations or peripheral edema  GI: NEGATIVE for nausea, abdominal pain, heartburn, or change in bowel habits  NEURO: NEGATIVE numbness/weakness  : see HPI  PSYCH: NEGATIVE depression/anxiety  LYmph: no new enlarged lymph nodes  Ortho: no new trauma/movements      Physical Exam:  BP (!) 196/105 (BP Location: Right arm, Patient Position: Sitting, Cuff Size: Adult Regular)   Pulse 72   Temp 98.4  F (36.9  C) (Oral)   Resp 16   SpO2 98%    Patient is pleasant, in no acute distress, good general condition.  Heart:  negative, PMI normal  Lung: no evidence of respiratory distress    Abdomen: Soft, nondistended, non tender. No masses. No rebound or guarding.   Exam: Penis no discharge.  Large left hydrocele.  Right testes normal.  Left testes not palpable.  No scrotal skin lesion.  Skin: Warm and dry.  No redness.  Neuro: grossly normal  Musculaskeletal: moving all extremities  Psych normal mood and affect  Musculoskeletal  moving all extremities  Hematologic/Lymphatic/Immunologic: normal ant/post cervical, axillary, supraclavicular and inguinal nodes  ULTRASOUND TESTICULAR AND SCROTUM WITH DOPPLER LIMITED  6/12/2019 7:42  AM     HISTORY: Testicular swelling for two months. No pain.     COMPARISONS: None.     FINDINGS: The testicles are of symmetric, homogeneous echotexture and  size measuring 4.7 x 3.5 x 2.4 cm on the right and 4.9 x 3.1 x 3.3 cm  on the left.  There is no intratesticular mass to suggest malignancy.   Spectral  Doppler demonstrates normal arterial waveforms within the  bilateral testicles.     The epididymides are of normal, symmetric size and vascularity. Small  simple-appearing left epididymal head cyst measures 0.5 x 0.4 x 0.3  cm.     There are moderate- to large-sized bilateral hydroceles. No  varicoceles are seen.                                                                      IMPRESSION:  Moderate- to large-sized bilateral hydroceles. Otherwise  negative scrotal ultrasound. No evidence for testicular torsion,  intratesticular mass, varicocele, epididymitis, or orchitis.     MARQUEZ MUÑIZ MD  Assessment/Plan:   Pleasant 48-year-old male with large left hydrocele.  Treatment options discussed with patient today.  We discussed observation versus surgical drainage.  Pros and cons discussed at length.  Patient is interested in surgical drainage.  Risks of bleeding, infection, recurrence rate, postop pain discussed at length.  I will schedule this elective procedure near future.    Hypertension: Follow-up with primary care.

## 2019-07-17 NOTE — TELEPHONE ENCOUNTER
Type of surgery: hydrocelectomy  Location of surgery: [facility]   Date of surgery: 8/8/19  Surgeon: Dr. Silva  Pre-Op Appt Date: 7/23/19  Post-Op Appt Date: 9/5/19   Packet sent out: Surgery packet mailed to patient's home address.   Pre-cert/Authorization completed: NA  Date: 7/17/2019    Roberta Myers  Surgery Scheduler

## 2019-07-17 NOTE — TELEPHONE ENCOUNTER
Patient is calling to schedule his surgery, please call him back at [phone]. He wants the surgery at [place].

## 2019-07-23 ENCOUNTER — OFFICE VISIT (OUTPATIENT)
Dept: FAMILY MEDICINE | Facility: OTHER | Age: 49
End: 2019-07-23
Payer: COMMERCIAL

## 2019-07-23 VITALS
DIASTOLIC BLOOD PRESSURE: 86 MMHG | WEIGHT: 208.1 LBS | RESPIRATION RATE: 16 BRPM | SYSTOLIC BLOOD PRESSURE: 138 MMHG | HEART RATE: 68 BPM | TEMPERATURE: 97 F | BODY MASS INDEX: 29.93 KG/M2 | OXYGEN SATURATION: 99 %

## 2019-07-23 DIAGNOSIS — I10 HYPERTENSION GOAL BP (BLOOD PRESSURE) < 140/90: ICD-10-CM

## 2019-07-23 DIAGNOSIS — Z01.818 PREOP GENERAL PHYSICAL EXAM: Primary | ICD-10-CM

## 2019-07-23 DIAGNOSIS — N43.3 HYDROCELE, UNSPECIFIED HYDROCELE TYPE: ICD-10-CM

## 2019-07-23 LAB
ALBUMIN SERPL-MCNC: 4.3 G/DL (ref 3.4–5)
ALBUMIN UR-MCNC: NEGATIVE MG/DL
ALP SERPL-CCNC: 65 U/L (ref 40–150)
ALT SERPL W P-5'-P-CCNC: 48 U/L (ref 0–70)
ANION GAP SERPL CALCULATED.3IONS-SCNC: 10 MMOL/L (ref 3–14)
APPEARANCE UR: CLEAR
AST SERPL W P-5'-P-CCNC: 36 U/L (ref 0–45)
BILIRUB SERPL-MCNC: 1.2 MG/DL (ref 0.2–1.3)
BILIRUB UR QL STRIP: NEGATIVE
BUN SERPL-MCNC: 10 MG/DL (ref 7–30)
CALCIUM SERPL-MCNC: 8.9 MG/DL (ref 8.5–10.1)
CHLORIDE SERPL-SCNC: 100 MMOL/L (ref 94–109)
CO2 SERPL-SCNC: 30 MMOL/L (ref 20–32)
COLOR UR AUTO: YELLOW
CREAT SERPL-MCNC: 0.89 MG/DL (ref 0.66–1.25)
ERYTHROCYTE [DISTWIDTH] IN BLOOD BY AUTOMATED COUNT: 12.8 % (ref 10–15)
GFR SERPL CREATININE-BSD FRML MDRD: >90 ML/MIN/{1.73_M2}
GLUCOSE SERPL-MCNC: 103 MG/DL (ref 70–99)
GLUCOSE UR STRIP-MCNC: NEGATIVE MG/DL
HCT VFR BLD AUTO: 45.3 % (ref 40–53)
HGB BLD-MCNC: 16.4 G/DL (ref 13.3–17.7)
HGB UR QL STRIP: NEGATIVE
KETONES UR STRIP-MCNC: NEGATIVE MG/DL
LEUKOCYTE ESTERASE UR QL STRIP: NEGATIVE
MCH RBC QN AUTO: 33.4 PG (ref 26.5–33)
MCHC RBC AUTO-ENTMCNC: 36.2 G/DL (ref 31.5–36.5)
MCV RBC AUTO: 92 FL (ref 78–100)
NITRATE UR QL: NEGATIVE
PH UR STRIP: 7.5 PH (ref 5–7)
PLATELET # BLD AUTO: 154 10E9/L (ref 150–450)
POTASSIUM SERPL-SCNC: 4 MMOL/L (ref 3.4–5.3)
PROT SERPL-MCNC: 7.5 G/DL (ref 6.8–8.8)
RBC # BLD AUTO: 4.91 10E12/L (ref 4.4–5.9)
SODIUM SERPL-SCNC: 140 MMOL/L (ref 133–144)
SOURCE: ABNORMAL
SP GR UR STRIP: 1.02 (ref 1–1.03)
UROBILINOGEN UR STRIP-ACNC: 0.2 EU/DL (ref 0.2–1)
WBC # BLD AUTO: 4.6 10E9/L (ref 4–11)

## 2019-07-23 PROCEDURE — 85027 COMPLETE CBC AUTOMATED: CPT | Performed by: INTERNAL MEDICINE

## 2019-07-23 PROCEDURE — 93000 ELECTROCARDIOGRAM COMPLETE: CPT | Performed by: INTERNAL MEDICINE

## 2019-07-23 PROCEDURE — 99214 OFFICE O/P EST MOD 30 MIN: CPT | Performed by: INTERNAL MEDICINE

## 2019-07-23 PROCEDURE — 81003 URINALYSIS AUTO W/O SCOPE: CPT | Performed by: INTERNAL MEDICINE

## 2019-07-23 PROCEDURE — 36415 COLL VENOUS BLD VENIPUNCTURE: CPT | Performed by: INTERNAL MEDICINE

## 2019-07-23 PROCEDURE — 80053 COMPREHEN METABOLIC PANEL: CPT | Performed by: INTERNAL MEDICINE

## 2019-07-23 RX ORDER — AMLODIPINE BESYLATE 5 MG/1
5 TABLET ORAL ONCE
Status: DISCONTINUED | OUTPATIENT
Start: 2019-07-23 | End: 2019-07-23

## 2019-07-23 ASSESSMENT — PAIN SCALES - GENERAL: PAINLEVEL: NO PAIN (0)

## 2019-07-23 NOTE — PROGRESS NOTES
[facility]  [address] [facility] [id number] [phone]  Dept: [phone]    PRE-OP EVALUATION:  Today's date: 2019    Serafin Hurd (: 1970) presents for pre-operative evaluation assessment as requested by Dr. Silva.  He requires evaluation and anesthesia risk assessment prior to undergoing surgery/procedure for treatment of hydrocele .    Fax number for surgical facility:   Primary Physician: Beto Ramos  Type of Anesthesia Anticipated: Local with MAC    Patient has a Health Care Directive or Living Will:  NO    Preop Questions 2019   Date of Surgery/Procedure: 19   Facility or Hospital where procedure/surgery will be performed: [facility]   1.  Do you have a history of Heart attack, stroke, stent, coronary bypass surgery, or other heart surgery? No   2.  Do you ever have any pain or discomfort in your chest? No   3.  Do you have a history of  Heart Failure? No   4.   Are you troubled by shortness of breath when:  walking on a level surface, or up a slight hill, or at night? No   5.  Do you currently have a cold, bronchitis or other respiratory infection? No   6.  Do you have a cough, shortness of breath, or wheezing? No   7.  Do you sometimes get pains in the calves of your legs when you walk? No   8. Do you or anyone in your family have previous history of blood clots? No   9.  Do you or does anyone in your family have a serious bleeding problem such as prolonged bleeding following surgeries or cuts? No   10. Have you ever had problems with anemia or been told to take iron pills? No   11. Have you had any abnormal blood loss such as black, tarry or bloody stools? No   12. Have you ever had a blood transfusion? No   13. Have you or any of your relatives ever had problems with anesthesia? No   14. Do you have sleep apnea, excessive snoring or daytime drowsiness? No   15. Do you have any prosthetic heart valves? No   16. Do you have prosthetic joints?  No         HPI:     HPI related to upcoming procedure: Patient has a hydrocele which is becoming progressively more painful.  After discussion with his urologist, he is decided to pursue surgical intervention.  Risks and benefits of the procedure are well-known to the patient and we have reiterated them again today      See problem list for active medical problems.  Problems all longstanding and stable, except as noted/documented.  See ROS for pertinent symptoms related to these conditions.      MEDICAL HISTORY:     Patient Active Problem List    Diagnosis Date Noted     Iliotibial band syndrome - bilateral 01/14/2013     Priority: Medium     Hypertension goal BP (blood pressure) < 140/90 06/28/2011     Priority: Medium     CARDIOVASCULAR SCREENING; LDL GOAL LESS THAN 160 10/31/2010     Priority: Medium     Deviated nasal septum 12/28/2004     Priority: Medium      Past Medical History:   Diagnosis Date     Hypertension      Unspecified glaucoma(365.9) 1998     Past Surgical History:   Procedure Laterality Date     REMOVAL OF SPERM DUCT(S)      Vasectomy     Current Outpatient Medications   Medication Sig Dispense Refill     XALATAN SOLN 0.005 % OP 1 DROP EACH EYE DAILY       OTC products: None, except as noted above    Allergies   Allergen Reactions     No Known Drug Allergies       Latex Allergy: NO    Social History     Tobacco Use     Smoking status: Never Smoker     Smokeless tobacco: Former User   Substance Use Topics     Alcohol use: Yes     Alcohol/week: 0.0 oz     Comment: Social     History   Drug Use No       REVIEW OF SYSTEMS:   CONSTITUTIONAL: NEGATIVE for fever, chills, change in weight  INTEGUMENTARY/SKIN: NEGATIVE for worrisome rashes, moles or lesions  EYES: NEGATIVE for vision changes or irritation  ENT/MOUTH: NEGATIVE for ear, mouth and throat problems  RESP: NEGATIVE for significant cough or SOB  CV: NEGATIVE for chest pain, palpitations or peripheral edema  GI: NEGATIVE for nausea, abdominal  pain, heartburn, or change in bowel habits   male :positive for large left hydrocele and smaller right  MUSCULOSKELETAL: NEGATIVE for significant arthralgias or myalgia  NEURO: NEGATIVE for weakness, dizziness or paresthesias  ENDOCRINE: NEGATIVE for temperature intolerance, skin/hair changes  HEME: NEGATIVE for bleeding problems  PSYCHIATRIC: NEGATIVE for changes in mood or affect    EXAM:   There were no vitals taken for this visit.    GENERAL APPEARANCE: healthy, alert and no distress     EYES: EOMI,  PERRL     HENT: ear canals and TM's normal and nose and mouth without ulcers or lesions     NECK: no adenopathy, no asymmetry, masses, or scars and thyroid normal to palpation     RESP: lungs clear to auscultation - no rales, rhonchi or wheezes     CV: regular rates and rhythm, normal S1 S2, no S3 or S4 and no murmur, click or rub     ABDOMEN:  soft, nontender, no HSM or masses and bowel sounds normal     GU_male: Left hydrocele is as noted.  Right is somewhat smaller     MS: extremities normal- no gross deformities noted, no evidence of inflammation in joints, FROM in all extremities.     SKIN: no suspicious lesions or rashes     NEURO: Normal strength and tone, sensory exam grossly normal, mentation intact and speech normal     PSYCH: mentation appears normal. and affect normal/bright     LYMPHATICS: No cervical adenopathy    DIAGNOSTICS:     EKG: appears normal, NSR, normal axis, normal intervals, no acute ST/T changes c/w ischemia, no LVH by voltage criteria, unchanged from previous tracings  Labs Drawn and in Process:   Unresulted Labs Ordered in the Past 30 Days of this Admission     Date and Time Order Name Status Description    7/23/2019 0804 COMPREHENSIVE METABOLIC PANEL In process           Recent Labs   Lab Test 06/18/18  0731 11/08/16  0741 07/28/16  1007   HGB 16.2 16.9 16.4   * 145* 136*     --  138   POTASSIUM 4.7  --  4.1   CR 0.88  --  0.78        IMPRESSION:   Reason for  surgery/procedure: Bilateral hydrocele with the left requiring surgical intervention at this time.  Diagnosis/reason for consult: Perioperative risk assessment in a pleasant 48-year-old male with:  1. Preop general physical exam    2. Hydrocele, unspecified hydrocele type  - *UA reflex to Microscopic and Culture ([facility] (except [place] and [place])  - CBC with platelets    3. Hypertension goal BP (blood pressure) < 140/90  Will start amlodipine 5 mg daily.  Blood pressure at the urologist office was extensively elevated but other blood pressures are generally minimally elevated intermittently.  - EKG 12-lead complete w/read - Clinics  - amLODIPine (NORVASC) tablet 5 mg  - Comprehensive metabolic panel      The proposed surgical procedure is considered LOW risk.    REVISED CARDIAC RISK INDEX  The patient has the following serious cardiovascular risks for perioperative complications such as (MI, PE, VFib and 3  AV Block):  No serious cardiac risks  INTERPRETATION: 0 risks: Class I (very low risk - 0.4% complication rate)    The patient has the following additional risks for perioperative complications:  No identified additional risks      ICD-10-CM    1. Preop general physical exam Z01.818        RECOMMENDATIONS:         --Patient is to take all scheduled medications on the day of surgery.          APPROVAL GIVEN to proceed with proposed procedure, without further diagnostic evaluation       Signed Electronically by: Beto Ramos DO    Copy of this evaluation report is provided to requesting physician.    [facility] Preop Guidelines    Revised Cardiac Risk Index

## 2019-07-23 NOTE — NURSING NOTE
Per Dr. Ramos, I have performed an EKG on pt. Results given to provider. Pt tolerated well. Nathaly Rodrigues CMA ([facility])

## 2019-07-25 ENCOUNTER — TELEPHONE (OUTPATIENT)
Dept: FAMILY MEDICINE | Facility: OTHER | Age: 49
End: 2019-07-25

## 2019-07-25 DIAGNOSIS — I10 HYPERTENSION GOAL BP (BLOOD PRESSURE) < 140/90: Primary | ICD-10-CM

## 2019-07-25 RX ORDER — LISINOPRIL 10 MG/1
10 TABLET ORAL DAILY
Qty: 30 TABLET | Refills: 0 | Status: SHIPPED | OUTPATIENT
Start: 2019-07-25 | End: 2019-08-20

## 2019-07-25 NOTE — TELEPHONE ENCOUNTER
I have called a prescription for lisinopril to his pharmacy.  He should start it now and notify me of any side effects or concerns prior to the surgery date.    Richard

## 2019-07-25 NOTE — TELEPHONE ENCOUNTER
Reason for call:  Other   Patient called regarding (reason for call): call back  Additional comments: Patient is asking if he is getting blood pressure medication or not. If so, please send to the [facility] in [place].      "[facility] [facility] - [place], [place] - [address]      Phone number to reach patient:  Cell number on file:    Telephone Information:   Mobile [phone]       Best Time:  ASAP     Can we leave a detailed message on this number?  YES

## 2019-07-26 NOTE — RESULT ENCOUNTER NOTE
Dear Serafin, your recent test results are attached.  The urine sample is negative.  The blood sugar is minimally elevated at 103 and the kidney function is excellent.  Liver tests are normal.  The blood cell count is normal without evidence of anemia or leukemia    Feel free to contact me via the office or My Chart if you have any questions regarding the above.  Sincerely,  Beto Ramos, DO [facility]

## 2019-08-07 ENCOUNTER — ANESTHESIA EVENT (OUTPATIENT)
Dept: SURGERY | Facility: CLINIC | Age: 49
End: 2019-08-07
Payer: COMMERCIAL

## 2019-08-07 ASSESSMENT — LIFESTYLE VARIABLES: TOBACCO_USE: 0

## 2019-08-08 ENCOUNTER — NURSE TRIAGE (OUTPATIENT)
Dept: NURSING | Facility: CLINIC | Age: 49
End: 2019-08-08

## 2019-08-08 ENCOUNTER — HOSPITAL ENCOUNTER (OUTPATIENT)
Facility: CLINIC | Age: 49
Discharge: HOME | End: 2019-08-08
Attending: UROLOGY | Admitting: UROLOGY
Payer: COMMERCIAL

## 2019-08-08 ENCOUNTER — ANESTHESIA (OUTPATIENT)
Dept: SURGERY | Facility: CLINIC | Age: 49
End: 2019-08-08
Payer: COMMERCIAL

## 2019-08-08 VITALS
SYSTOLIC BLOOD PRESSURE: 129 MMHG | TEMPERATURE: 98.1 F | OXYGEN SATURATION: 97 % | DIASTOLIC BLOOD PRESSURE: 100 MMHG | HEART RATE: 70 BPM | RESPIRATION RATE: 25 BRPM

## 2019-08-08 DIAGNOSIS — N43.2 OTHER HYDROCELE: Primary | ICD-10-CM

## 2019-08-08 PROCEDURE — 55060 REPAIR OF HYDROCELE: CPT | Mod: LT | Performed by: UROLOGY

## 2019-08-08 PROCEDURE — 25000128 H RX IP 250 OP 636: Performed by: UROLOGY

## 2019-08-08 PROCEDURE — 37000009 ZZH ANESTHESIA TECHNICAL FEE, EACH ADDTL 15 MIN: Performed by: UROLOGY

## 2019-08-08 PROCEDURE — 25000566 ZZH SEVOFLURANE, EA 15 MIN: Performed by: UROLOGY

## 2019-08-08 PROCEDURE — 40000306 ZZH STATISTIC PRE PROC ASSESS II: Performed by: UROLOGY

## 2019-08-08 PROCEDURE — 36000050 ZZH SURGERY LEVEL 2 1ST 30 MIN: Performed by: UROLOGY

## 2019-08-08 PROCEDURE — 25800030 ZZH RX IP 258 OP 636: Performed by: NURSE ANESTHETIST, CERTIFIED REGISTERED

## 2019-08-08 PROCEDURE — 71000027 ZZH RECOVERY PHASE 2 EACH 15 MINS: Performed by: UROLOGY

## 2019-08-08 PROCEDURE — 25000125 ZZHC RX 250: Performed by: NURSE ANESTHETIST, CERTIFIED REGISTERED

## 2019-08-08 PROCEDURE — 37000008 ZZH ANESTHESIA TECHNICAL FEE, 1ST 30 MIN: Performed by: UROLOGY

## 2019-08-08 PROCEDURE — 36000052 ZZH SURGERY LEVEL 2 EA 15 ADDTL MIN: Performed by: UROLOGY

## 2019-08-08 PROCEDURE — 25000125 ZZHC RX 250: Performed by: UROLOGY

## 2019-08-08 PROCEDURE — 71000014 ZZH RECOVERY PHASE 1 LEVEL 2 FIRST HR: Performed by: UROLOGY

## 2019-08-08 PROCEDURE — 25000128 H RX IP 250 OP 636: Performed by: NURSE ANESTHETIST, CERTIFIED REGISTERED

## 2019-08-08 PROCEDURE — 27210794 ZZH OR GENERAL SUPPLY STERILE: Performed by: UROLOGY

## 2019-08-08 RX ORDER — BUPIVACAINE HYDROCHLORIDE 2.5 MG/ML
INJECTION, SOLUTION EPIDURAL; INFILTRATION; INTRACAUDAL PRN
Status: DISCONTINUED | OUTPATIENT
Start: 2019-08-08 | End: 2019-08-08 | Stop reason: HOSPADM

## 2019-08-08 RX ORDER — HYDROMORPHONE HYDROCHLORIDE 1 MG/ML
.3-.5 INJECTION, SOLUTION INTRAMUSCULAR; INTRAVENOUS; SUBCUTANEOUS EVERY 10 MIN PRN
Status: DISCONTINUED | OUTPATIENT
Start: 2019-08-08 | End: 2019-08-08 | Stop reason: HOSPADM

## 2019-08-08 RX ORDER — HYDROCODONE BITARTRATE AND ACETAMINOPHEN 5; 325 MG/1; MG/1
1-2 TABLET ORAL EVERY 4 HOURS PRN
Qty: 25 TABLET | Refills: 0 | Status: SHIPPED | OUTPATIENT
Start: 2019-08-08 | End: 2020-06-22

## 2019-08-08 RX ORDER — PROPOFOL 10 MG/ML
INJECTION, EMULSION INTRAVENOUS PRN
Status: DISCONTINUED | OUTPATIENT
Start: 2019-08-08 | End: 2019-08-08

## 2019-08-08 RX ORDER — ONDANSETRON 2 MG/ML
4 INJECTION INTRAMUSCULAR; INTRAVENOUS EVERY 30 MIN PRN
Status: DISCONTINUED | OUTPATIENT
Start: 2019-08-08 | End: 2019-08-08 | Stop reason: HOSPADM

## 2019-08-08 RX ORDER — FENTANYL CITRATE 50 UG/ML
INJECTION, SOLUTION INTRAMUSCULAR; INTRAVENOUS PRN
Status: DISCONTINUED | OUTPATIENT
Start: 2019-08-08 | End: 2019-08-08

## 2019-08-08 RX ORDER — FENTANYL CITRATE 50 UG/ML
25-50 INJECTION, SOLUTION INTRAMUSCULAR; INTRAVENOUS
Status: DISCONTINUED | OUTPATIENT
Start: 2019-08-08 | End: 2019-08-08 | Stop reason: HOSPADM

## 2019-08-08 RX ORDER — SODIUM CHLORIDE, SODIUM LACTATE, POTASSIUM CHLORIDE, CALCIUM CHLORIDE 600; 310; 30; 20 MG/100ML; MG/100ML; MG/100ML; MG/100ML
INJECTION, SOLUTION INTRAVENOUS CONTINUOUS
Status: DISCONTINUED | OUTPATIENT
Start: 2019-08-08 | End: 2019-08-08 | Stop reason: HOSPADM

## 2019-08-08 RX ORDER — CEFAZOLIN SODIUM 2 G/100ML
2 INJECTION, SOLUTION INTRAVENOUS
Status: DISCONTINUED | OUTPATIENT
Start: 2019-08-08 | End: 2019-08-08 | Stop reason: HOSPADM

## 2019-08-08 RX ORDER — GINSENG 100 MG
CAPSULE ORAL PRN
Status: DISCONTINUED | OUTPATIENT
Start: 2019-08-08 | End: 2019-08-08 | Stop reason: HOSPADM

## 2019-08-08 RX ORDER — LIDOCAINE HYDROCHLORIDE 20 MG/ML
INJECTION, SOLUTION INFILTRATION; PERINEURAL PRN
Status: DISCONTINUED | OUTPATIENT
Start: 2019-08-08 | End: 2019-08-08

## 2019-08-08 RX ORDER — ONDANSETRON 2 MG/ML
INJECTION INTRAMUSCULAR; INTRAVENOUS PRN
Status: DISCONTINUED | OUTPATIENT
Start: 2019-08-08 | End: 2019-08-08

## 2019-08-08 RX ORDER — CEFAZOLIN SODIUM 1 G/3ML
1 INJECTION, POWDER, FOR SOLUTION INTRAMUSCULAR; INTRAVENOUS SEE ADMIN INSTRUCTIONS
Status: DISCONTINUED | OUTPATIENT
Start: 2019-08-08 | End: 2019-08-08 | Stop reason: HOSPADM

## 2019-08-08 RX ORDER — MEPERIDINE HYDROCHLORIDE 25 MG/ML
12.5 INJECTION INTRAMUSCULAR; INTRAVENOUS; SUBCUTANEOUS
Status: DISCONTINUED | OUTPATIENT
Start: 2019-08-08 | End: 2019-08-08 | Stop reason: HOSPADM

## 2019-08-08 RX ORDER — NALOXONE HYDROCHLORIDE 0.4 MG/ML
.1-.4 INJECTION, SOLUTION INTRAMUSCULAR; INTRAVENOUS; SUBCUTANEOUS
Status: DISCONTINUED | OUTPATIENT
Start: 2019-08-08 | End: 2019-08-08 | Stop reason: HOSPADM

## 2019-08-08 RX ORDER — ONDANSETRON 4 MG/1
4 TABLET, ORALLY DISINTEGRATING ORAL EVERY 30 MIN PRN
Status: DISCONTINUED | OUTPATIENT
Start: 2019-08-08 | End: 2019-08-08 | Stop reason: HOSPADM

## 2019-08-08 RX ADMIN — MIDAZOLAM 2 MG: 1 INJECTION INTRAMUSCULAR; INTRAVENOUS at 12:16

## 2019-08-08 RX ADMIN — ONDANSETRON 4 MG: 2 INJECTION INTRAMUSCULAR; INTRAVENOUS at 12:36

## 2019-08-08 RX ADMIN — PROPOFOL 200 MG: 10 INJECTION, EMULSION INTRAVENOUS at 12:20

## 2019-08-08 RX ADMIN — HYDROMORPHONE HYDROCHLORIDE 0.5 MG: 1 INJECTION, SOLUTION INTRAMUSCULAR; INTRAVENOUS; SUBCUTANEOUS at 13:23

## 2019-08-08 RX ADMIN — LIDOCAINE HYDROCHLORIDE 1 ML: 10 INJECTION, SOLUTION EPIDURAL; INFILTRATION; INTRACAUDAL; PERINEURAL at 11:28

## 2019-08-08 RX ADMIN — FENTANYL CITRATE 50 MCG: 50 INJECTION, SOLUTION INTRAMUSCULAR; INTRAVENOUS at 12:13

## 2019-08-08 RX ADMIN — FENTANYL CITRATE 50 MCG: 50 INJECTION, SOLUTION INTRAMUSCULAR; INTRAVENOUS at 12:20

## 2019-08-08 RX ADMIN — SODIUM CHLORIDE, POTASSIUM CHLORIDE, SODIUM LACTATE AND CALCIUM CHLORIDE: 600; 310; 30; 20 INJECTION, SOLUTION INTRAVENOUS at 11:28

## 2019-08-08 RX ADMIN — LIDOCAINE HYDROCHLORIDE 100 MG: 20 INJECTION, SOLUTION INFILTRATION; PERINEURAL at 12:16

## 2019-08-08 RX ADMIN — FENTANYL CITRATE 50 MCG: 50 INJECTION INTRAMUSCULAR; INTRAVENOUS at 13:17

## 2019-08-08 NOTE — ANESTHESIA CARE TRANSFER NOTE
Patient: Serafin Hurd    Procedure(s):  hydrocelectomy    Diagnosis: Hydrocele in adult  Diagnosis Additional Information: No value filed.    Anesthesia Type:   General     Note:  Airway :Face Mask  Patient transferred to:PACU  Handoff Report: Identifed the Patient, Identified the Reponsible Provider, Reviewed the pertinent medical history, Discussed the surgical course, Reviewed Intra-OP anesthesia mangement and issues during anesthesia, Set expectations for post-procedure period and Allowed opportunity for questions and acknowledgement of understanding      Vitals: (Last set prior to Anesthesia Care Transfer)    CRNA VITALS  8/8/2019 1228 - 8/8/2019 1328      8/8/2019             Resp Rate (observed):  4  (Abnormal)                 Electronically Signed By: RICKEY Moreno CRNA  August 8, 2019  1:54 PM

## 2019-08-08 NOTE — TELEPHONE ENCOUNTER
Lost wallet.  Found it. Thought it was at the hospital. Found it at home. He had surgery today.  Camila XIONG RN [facility]

## 2019-08-08 NOTE — BRIEF OP NOTE
[facility]  Brief Operative Note    Pre-operative diagnosis: Left hydrocele   Post-operative diagnosis same   Procedure: Procedure(s):  hydrocelectomy   Surgeon: Scott Silva MD   Assistants(s): None   Anesthesia: General    Estimated blood loss: minimal                   Specimens: None   Implants: None       Complications: None   Condition on discharge: Stable   Findings: Large hydrocele  See dictated operative report for full details

## 2019-08-08 NOTE — DISCHARGE INSTRUCTIONS
[facility] Same-Day Surgery   Adult Discharge Orders & Instructions     For 24 hours after surgery    1. Get plenty of rest.  A responsible adult must stay with you for at least 24 hours after you leave the hospital.   2. Do not drive or use heavy equipment.  If you have weakness or tingling, don't drive or use heavy equipment until this feeling goes away.  3. Do not drink alcohol.  4. Avoid strenuous or risky activities.  Ask for help when climbing stairs.   5. You may feel lightheaded.  If so, sit for a few minutes before standing.  Have someone help you get up.   6. You may have a slight fever. Call the doctor if your fever is over 100 F (37.7 C) (taken under the tongue) or lasts longer than 24 hours.  7. You may have a dry mouth, a sore throat, muscle aches or trouble sleeping.  These should go away after 24 hours.  8. Do not make important or legal decisions.  We don t expect you to have any problems from the surgery or treatment you had today. Just in case, here s what to do if you have pain, upset stomach (nausea), bleeding or infection:  Pain:  Take medicines your doctor has prescribed or over-the-counter medicine they have suggested. Resting and using ice packs can help, too. For surgery on an arm or leg, raise it on a pillow to ease swelling. Call your doctor if these methods don t work.  Copyright [employer] Licensed under [employer]  Upset stomach (nausea):  Take anti-nausea medicine approved by your doctor. Drink clear liquids like apple juice, ginger ale, broth or 7-Up. Be sure to drink enough fluids. Rest can help, too. Move to normal foods when you re ready. Bleeding:  In the first 24 hours, you may see a little blood on your dressing, about the size of a quarter. You don t need to worry about this much blood, but if the blood spot keeps getting bigger:    Put pressure on the wound if you can, AND    Call your doctor.  Copyright [employer], Licensed under [employer]  International  Fever/Infection: Please call your doctor if you have any of these signs:    Redness    Swelling    Wound feels warm    Pain gets worse    Bad-smelling fluid leaks from wound    Fever or chills  Call your doctor for any of the followin.  It has been over 8 to 10 hours since surgery and you are still not able to urinate (pass water).    2.  Headache for over 24 hours.    3.  Numbness, tingling or weakness in your legs the day after surgery (if you had spinal anesthesia).    Nurse advice line: [phone]

## 2019-08-08 NOTE — ANESTHESIA PREPROCEDURE EVALUATION
Anesthesia Pre-Procedure Evaluation    Patient: Serafin Hurd   MRN: [id number] : 1970          Preoperative Diagnosis: Hydrocele in adult    Procedure(s):  hydrocelectomy    Past Medical History:   Diagnosis Date     Hypertension      Unspecified glaucoma(365.9)      Past Surgical History:   Procedure Laterality Date     REMOVAL OF SPERM DUCT(S)      Vasectomy       Anesthesia Evaluation     . Pt has had prior anesthetic. Type: MAC           ROS/MED HX    ENT/Pulmonary:  - neg pulmonary ROS    (-) tobacco use   Neurologic:  - neg neurologic ROS     Cardiovascular:     (+) hypertension----. : . . . :. . Previous cardiac testing date:results:date: results:ECG reviewed date:19 results:SR date: results:          METS/Exercise Tolerance:     Hematologic:  - neg hematologic  ROS       Musculoskeletal:  - neg musculoskeletal ROS       GI/Hepatic:  - neg GI/hepatic ROS       Renal/Genitourinary:  - ROS Renal section negative       Endo:  - neg endo ROS       Psychiatric:  - neg psychiatric ROS       Infectious Disease:  - neg infectious disease ROS       Malignancy:      - no malignancy   Other:    - neg other ROS                      Physical Exam  Normal systems: cardiovascular and dental    Airway   Mallampati: II  TM distance: <3 FB  Neck ROM: full    Dental     Cardiovascular   Rhythm and rate: regular and normal      Pulmonary    breath sounds clear to auscultation            Lab Results   Component Value Date    WBC 4.6 2019    HGB 16.4 2019    HCT 45.3 2019     2019     2019    POTASSIUM 4.0 2019    CHLORIDE 100 2019    CO2 30 2019    BUN 10 2019    CR 0.89 2019     (H) 2019    EVA 8.9 2019    ALBUMIN 4.3 2019    PROTTOTAL 7.5 2019    ALT 48 2019    AST 36 2019    ALKPHOS 65 2019    BILITOTAL 1.2 2019    TSH 1.28 2008       Preop Vitals  BP Readings from Last 3  "Encounters:   07/23/19 138/86   07/17/19 (!) 196/105   06/25/19 (!) 146/92    Pulse Readings from Last 3 Encounters:   07/23/19 68   07/17/19 72   06/25/19 72      Resp Readings from Last 3 Encounters:   07/23/19 16   07/17/19 16   06/25/19 16    SpO2 Readings from Last 3 Encounters:   07/23/19 99%   07/17/19 98%   06/25/19 97%      Temp Readings from Last 1 Encounters:   07/23/19 97  F (36.1  C) (Temporal)    Ht Readings from Last 1 Encounters:   06/25/19 1.776 m (5' 9.92\")      Wt Readings from Last 1 Encounters:   07/23/19 94.4 kg (208 lb 1.6 oz)    Estimated body mass index is 29.93 kg/m  as calculated from the following:    Height as of 6/25/19: 1.776 m (5' 9.92\").    Weight as of 7/23/19: 94.4 kg (208 lb 1.6 oz).       Anesthesia Plan      History & Physical Review  History and physical reviewed and following examination; no interval change.    ASA Status:  2 .    NPO Status:  > 8 hours    Plan for MAC with Propofol induction. Maintenance will be TIVA.  Reason for MAC:  Deep or markedly invasive procedure (G8)  PONV prophylaxis:  Ondansetron (or other 5HT-3) and Dexamethasone or Solumedrol       Postoperative Care  Postoperative pain management:  IV analgesics and Multi-modal analgesia.      Consents  Anesthetic plan, risks, benefits and alternatives discussed with:  Patient.  Use of blood products discussed: No .   .                 RICKEY Moreno CRNA  "

## 2019-08-08 NOTE — ANESTHESIA POSTPROCEDURE EVALUATION
Patient: Serafin Hurd    Procedure(s):  hydrocelectomy    Diagnosis:Hydrocele in adult  Diagnosis Additional Information: No value filed.    Anesthesia Type:  General    Note:  Anesthesia Post Evaluation    Patient location during evaluation: Phase 2 and Bedside  Patient participation: Able to fully participate in evaluation  Level of consciousness: awake and alert  Pain management: adequate  Airway patency: patent  Cardiovascular status: acceptable  Respiratory status: acceptable  Hydration status: acceptable  PONV: none     Anesthetic complications: None    Comments: Patient was pleased with his care today. There were no anesthesia related complications noted. Will follow as needed.        Last vitals:  Vitals:    08/08/19 1320 08/08/19 1325 08/08/19 1330   BP: (!) 135/100 (!) 144/98 (!) 129/100   Pulse: 65 69 70   Resp: (!) 39 (!) 0 25   Temp:      SpO2: 97% 97% 97%         Electronically Signed By: RICKEY Moreno CRNA  August 8, 2019  1:55 PM

## 2019-08-08 NOTE — OP NOTE
PREOPERATIVE DIAGNOSIS:  left hydrocele.      POSTOPERATIVE DIAGNOSIS: same     PROCEDURE:  left hydrocelectomy.      DESCRIPTION OF PROCEDURE:  The patient was brought to the operating room.  After anesthetics was induced, he was placed supine.  He was draped and prepped in the usual surgical fashion.  Preoperative antibiotic was given.  The median raphe of the scrotum was identified and local anesthetic was injected.  A small incision was made over the median raphe.  The incision was carried through the skin, dartos fascia until the hydrocele sac was identified.  The hydrocele sac was then opened with the fluid drained.  The testis, along with the hydrocele sac, was delivered outside of the incision.  The hydrocele sac was then everted and this was reapproximated using 2-0 Vicryl suture.  The testis was then delivered back into the scrotum.  The area was then irrigated.  A small incision was made and a 1/4 inch Penrose was placed into the scrotal for overnight drainage.  The dartos fascia was then reapproximated using 3-0 chromic suture.  The same thing was done to the skin edges.  The patient tolerated the procedure well.  There were no complications identified during the procedure.  There was minimal bleeding during the operation.         ANAI GARIBAY MD

## 2019-08-20 DIAGNOSIS — I10 HYPERTENSION GOAL BP (BLOOD PRESSURE) < 140/90: ICD-10-CM

## 2019-08-21 RX ORDER — LISINOPRIL 10 MG/1
TABLET ORAL
Qty: 30 TABLET | Refills: 0 | Status: SHIPPED | OUTPATIENT
Start: 2019-08-21 | End: 2019-09-24

## 2019-08-21 NOTE — TELEPHONE ENCOUNTER
"Requested Prescriptions   Pending Prescriptions Disp Refills     lisinopril (PRINIVIL/ZESTRIL) 10 MG tablet [Pharmacy Med Name: LISINOPRIL 10MG TABS] 30 tablet 0     Sig: TAKE ONE TABLET BY MOUTH DAILY   Last Written Prescription Date:  7/25/19  Last Fill Quantity: 30,  # refills: 0   Last office visit: 7/23/2019 with prescribing provider:  7/23/19   Future Office Visit:   Next 5 appointments (look out 90 days)    Sep 05, 2019  8:00 AM CDT  [facility] Silva [facility]  [facility] [address] [facility] [address]  [phone]           ACE Inhibitors (Including Combos) Protocol Failed - 8/20/2019  6:23 PM        Failed - Blood pressure under 140/90 in past 12 months     BP Readings from Last 3 Encounters:   08/08/19 (!) 129/100   07/23/19 138/86   07/17/19 (!) 196/105                 Passed - Recent (12 mo) or future (30 days) visit within the authorizing provider's specialty     Patient had office visit in the last 12 months or has a visit in the next 30 days with authorizing provider or within the authorizing provider's specialty.  See \"Patient Info\" tab in inbasket, or \"Choose Columns\" in Meds & Orders section of the refill encounter.              Passed - Medication is active on med list        Passed - Patient is age 18 or older        Passed - Normal serum creatinine on file in past 12 months     Recent Labs   Lab Test 07/23/19  0818   CR 0.89             Passed - Normal serum potassium on file in past 12 months     Recent Labs   Lab Test 07/23/19  0818   POTASSIUM 4.0             "

## 2019-09-05 ENCOUNTER — OFFICE VISIT (OUTPATIENT)
Dept: UROLOGY | Facility: OTHER | Age: 49
End: 2019-09-05
Payer: COMMERCIAL

## 2019-09-05 VITALS
SYSTOLIC BLOOD PRESSURE: 163 MMHG | OXYGEN SATURATION: 100 % | DIASTOLIC BLOOD PRESSURE: 91 MMHG | RESPIRATION RATE: 16 BRPM | HEART RATE: 70 BPM | TEMPERATURE: 98.4 F

## 2019-09-05 DIAGNOSIS — I10 HYPERTENSION GOAL BP (BLOOD PRESSURE) < 140/90: ICD-10-CM

## 2019-09-05 DIAGNOSIS — N43.3 HYDROCELE IN ADULT: Primary | ICD-10-CM

## 2019-09-05 PROCEDURE — 99024 POSTOP FOLLOW-UP VISIT: CPT | Performed by: UROLOGY

## 2019-09-05 ASSESSMENT — PAIN SCALES - GENERAL: PAINLEVEL: NO PAIN (0)

## 2019-09-05 NOTE — PROGRESS NOTES
Chief Complaint   Patient presents with     Surgical Followup     no concerns       Serafin Hurd is a 48 year old male who presents today for follow up of   Chief Complaint   Patient presents with     Surgical Followup     no concerns   Patient is here for follow-up after recent left hydrocelectomy.  He is without any complaints.    Current Outpatient Medications   Medication Sig Dispense Refill     lisinopril (PRINIVIL/ZESTRIL) 10 MG tablet TAKE ONE TABLET BY MOUTH DAILY 30 tablet 0     XALATAN SOLN 0.005 % OP 1 DROP EACH EYE DAILY       HYDROcodone-acetaminophen (NORCO) 5-325 MG tablet Take 1-2 tablets by mouth every 4 hours as needed for moderate to severe pain (Patient not taking: Reported on 9/5/2019) 25 tablet 0     Allergies   Allergen Reactions     No Known Drug Allergies       Past Medical History:   Diagnosis Date     Hypertension      Unspecified glaucoma(365.9) 1998     Past Surgical History:   Procedure Laterality Date     HYDROCELECTOMY SCROTAL Left 8/8/2019    Procedure: hydrocelectomy;  Surgeon: Scott Silva MD;  Location: [facility]     REMOVAL OF SPERM DUCT(S)      Vasectomy     Family History   Problem Relation Age of Onset     Depression Mother      C.A.D. Paternal Uncle      Social History     Socioeconomic History     Marital status:      Spouse name: None     Number of children: None     Years of education: None     Highest education level: None   Occupational History     None   Social Needs     Financial resource strain: None     Food insecurity:     Worry: None     Inability: None     Transportation needs:     Medical: None     Non-medical: None   Tobacco Use     Smoking status: Never Smoker     Smokeless tobacco: Former User   Substance and Sexual Activity     Alcohol use: Yes     Alcohol/week: 0.0 oz     Comment: Social     Drug use: No     Sexual activity: Yes     Partners: Female     Birth control/protection: Surgical   Lifestyle     Physical activity:     Days per week: None      Minutes per session: None     Stress: None   Relationships     Social connections:     Talks on phone: None     Gets together: None     Attends Worship service: None     Active member of club or organization: None     Attends meetings of clubs or organizations: None     Relationship status: None     Intimate partner violence:     Fear of current or ex partner: None     Emotionally abused: None     Physically abused: None     Forced sexual activity: None   Other Topics Concern     Parent/sibling w/ CABG, MI or angioplasty before 65F 55M? No   Social History Narrative     None       REVIEW OF SYSTEMS  =================  C: NEGATIVE for fever, chills, change in weight  I: NEGATIVE for worrisome rashes, moles or lesions  E/M: NEGATIVE for ear, mouth and throat problems  R: NEGATIVE for significant cough or SHORTNESS OF BREATH,   CV: NEGATIVE for chest pain, palpitations or peripheral edema  GI: NEGATIVE for nausea, abdominal pain, heartburn, or change in bowel habits  NEURO: NEGATIVE any motor/sensory changes  PSYCH: NEGATIVE for recent mood disorder    Physical Exam:  BP (!) 163/91 (BP Location: Right arm, Patient Position: Sitting, Cuff Size: Adult Large)   Pulse 70   Temp 98.4  F (36.9  C) (Oral)   Resp 16   SpO2 100%    Patient is pleasant, in no acute distress, good general condition.  Lung: no evidence of respiratory distress    Abdomen: Soft, nondistended, non tender. No masses. No rebound or guarding.   Exam: Incision clean dry intact.  No evidence of recurrence.  Skin: Warm and dry.  No redness.  Psych: normal mood and affect  Neuro: alert and oriented  Musculaskeletal: moving all extremities    Assessment/Plan:   (N43.3) Hydrocele in adult  (primary encounter diagnosis)  Comment: Doing well postop.  Plan: As needed.    (I10) Hypertension goal BP (blood pressure) < 140/90  Comment:    Plan: Follow-up with primary care MD.

## 2019-09-24 DIAGNOSIS — I10 HYPERTENSION GOAL BP (BLOOD PRESSURE) < 140/90: ICD-10-CM

## 2019-09-25 NOTE — TELEPHONE ENCOUNTER
"Routing refill request to provider for review/approval because:  Labs out of range:  BP  T'd up 1 month for provider review.      Requested Prescriptions   Pending Prescriptions Disp Refills     lisinopril (PRINIVIL/ZESTRIL) 10 MG tablet [Pharmacy Med Name: LISINOPRIL 10MG TABS] 30 tablet 0     Sig: TAKE ONE TABLET BY MOUTH ONCE DAILY **NEEDS BLOOD PRESSURE CHECK IN CLINIC**   Last Written Prescription Date:  8/21/2019  Last Fill Quantity: 30,  # refills: 0   Last office visit: 7/23/2019 with prescribing provider:     Future Office Visit:        ACE Inhibitors (Including Combos) Protocol Failed - 9/24/2019  3:48 PM        Failed - Blood pressure under 140/90 in past 12 months     BP Readings from Last 3 Encounters:   09/05/19 (!) 163/91   08/08/19 (!) 129/100   07/23/19 138/86           Passed - Recent (12 mo) or future (30 days) visit within the authorizing provider's specialty     Patient had office visit in the last 12 months or has a visit in the next 30 days with authorizing provider or within the authorizing provider's specialty.  See \"Patient Info\" tab in inbasket, or \"Choose Columns\" in Meds & Orders section of the refill encounter.            Passed - Medication is active on med list        Passed - Patient is age 18 or older        Passed - Normal serum creatinine on file in past 12 months     Recent Labs   Lab Test 07/23/19 0818   CR 0.89           Passed - Normal serum potassium on file in past 12 months     Recent Labs   Lab Test 07/23/19 0818   POTASSIUM 4.0             "

## 2019-09-26 RX ORDER — LISINOPRIL 10 MG/1
10 TABLET ORAL DAILY
Qty: 30 TABLET | Refills: 0 | Status: SHIPPED | OUTPATIENT
Start: 2019-09-26 | End: 2019-10-31

## 2019-09-28 ENCOUNTER — HEALTH MAINTENANCE LETTER (OUTPATIENT)
Age: 49
End: 2019-09-28

## 2019-10-31 DIAGNOSIS — I10 HYPERTENSION GOAL BP (BLOOD PRESSURE) < 140/90: ICD-10-CM

## 2019-10-31 RX ORDER — LISINOPRIL 10 MG/1
TABLET ORAL
Qty: 30 TABLET | Refills: 0 | Status: SHIPPED | OUTPATIENT
Start: 2019-10-31 | End: 2019-12-05

## 2019-10-31 NOTE — TELEPHONE ENCOUNTER
"Lisinopril  Last Written Prescription Date:  09/26/2019  Last Fill Quantity: 30,  # refills: 0   Last office visit: 7/23/2019 with prescribing provider:  mendoza   Future Office Visit:  None  Routing refill request to provider for review/approval because:  Blood pressures elevated above goal.     Requested Prescriptions   Pending Prescriptions Disp Refills     lisinopril (PRINIVIL/ZESTRIL) 10 MG tablet [Pharmacy Med Name: LISINOPRIL 10MG TABS] 30 tablet 0     Sig: TAKE ONE TABLET BY MOUTH ONCE DAILY       ACE Inhibitors (Including Combos) Protocol Failed - 10/31/2019  9:36 AM        Failed - Blood pressure under 140/90 in past 12 months     BP Readings from Last 3 Encounters:   09/05/19 (!) 163/91   08/08/19 (!) 129/100   07/23/19 138/86           Passed - Recent (12 mo) or future (30 days) visit within the authorizing provider's specialty     Patient has had an office visit with the authorizing provider or a provider within the authorizing providers department within the previous 12 mos or has a future within next 30 days. See \"Patient Info\" tab in inbasket, or \"Choose Columns\" in Meds & Orders section of the refill encounter.          Passed - Medication is active on med list        Passed - Patient is age 18 or older        Passed - Normal serum creatinine on file in past 12 months     Recent Labs   Lab Test 07/23/19  0818   CR 0.89           Passed - Normal serum potassium on file in past 12 months     Recent Labs   Lab Test 07/23/19  0818   POTASSIUM 4.0         Kathy Benito RN   "

## 2019-12-05 DIAGNOSIS — I10 HYPERTENSION GOAL BP (BLOOD PRESSURE) < 140/90: ICD-10-CM

## 2019-12-05 RX ORDER — LISINOPRIL 10 MG/1
TABLET ORAL
Qty: 30 TABLET | Refills: 0 | Status: SHIPPED | OUTPATIENT
Start: 2019-12-05 | End: 2020-01-07

## 2019-12-05 NOTE — TELEPHONE ENCOUNTER
"Lisinopril  Last Written Prescription Date:  10/31/2019  Last Fill Quantity: 30,  # refills: 0   Last office visit: 7/23/2019 with prescribing provider:  Richard   Future Office Visit:  None  Prescription approved per [facility] Refill [facility].    Requested Prescriptions   Pending Prescriptions Disp Refills     lisinopril (PRINIVIL/ZESTRIL) 10 MG tablet [Pharmacy Med Name: LISINOPRIL 10MG TABS] 30 tablet 0     Sig: TAKE ONE TABLET BY MOUTH ONCE DAILY       ACE Inhibitors (Including Combos) Protocol Failed - 12/5/2019  6:24 AM        Failed - Blood pressure under 140/90 in past 12 months     BP Readings from Last 3 Encounters:   09/05/19 (!) 163/91   08/08/19 (!) 129/100   07/23/19 138/86           Passed - Recent (12 mo) or future (30 days) visit within the authorizing provider's specialty     Patient has had an office visit with the authorizing provider or a provider within the authorizing providers department within the previous 12 mos or has a future within next 30 days. See \"Patient Info\" tab in inbasket, or \"Choose Columns\" in Meds & Orders section of the refill encounter.          Passed - Medication is active on med list        Passed - Patient is age 18 or older        Passed - Normal serum creatinine on file in past 12 months     Recent Labs   Lab Test 07/23/19  0818   CR 0.89           Passed - Normal serum potassium on file in past 12 months     Recent Labs   Lab Test 07/23/19  0818   POTASSIUM 4.0         Kathy Benito RN   "

## 2020-01-05 DIAGNOSIS — I10 HYPERTENSION GOAL BP (BLOOD PRESSURE) < 140/90: ICD-10-CM

## 2020-01-06 NOTE — TELEPHONE ENCOUNTER
"Requested Prescriptions   Pending Prescriptions Disp Refills     lisinopril (PRINIVIL/ZESTRIL) 10 MG tablet [Pharmacy Med Name: LISINOPRIL 10MG TABS] 30 tablet 0     Sig: TAKE ONE TABLET BY MOUTH ONCE DAILY   Last Written Prescription Date:  12/05/2019  Last Fill Quantity: 30,  # refills: 0   Last office visit: 7/23/2019 with prescribing provider:  07/23/2019-Pre-op   Future Office Visit:        ACE Inhibitors (Including Combos) Protocol Failed - 1/5/2020 10:50 AM        Failed - Blood pressure under 140/90 in past 12 months     BP Readings from Last 3 Encounters:   09/05/19 (!) 163/91   08/08/19 (!) 129/100   07/23/19 138/86                 Passed - Recent (12 mo) or future (30 days) visit within the authorizing provider's specialty     Patient has had an office visit with the authorizing provider or a provider within the authorizing providers department within the previous 12 mos or has a future within next 30 days. See \"Patient Info\" tab in inbasket, or \"Choose Columns\" in Meds & Orders section of the refill encounter.              Passed - Medication is active on med list        Passed - Patient is age 18 or older        Passed - Normal serum creatinine on file in past 12 months     Recent Labs   Lab Test 07/23/19 0818   CR 0.89             Passed - Normal serum potassium on file in past 12 months     Recent Labs   Lab Test 07/23/19 0818   POTASSIUM 4.0               "

## 2020-01-07 RX ORDER — LISINOPRIL 10 MG/1
TABLET ORAL
Qty: 30 TABLET | Refills: 0 | Status: SHIPPED | OUTPATIENT
Start: 2020-01-07 | End: 2020-02-10

## 2020-01-07 NOTE — TELEPHONE ENCOUNTER
Routing refill request to provider for review/approval because:  Labs out of range:  BP    MUKESH GuzmanN, RN  [facility]

## 2020-02-08 DIAGNOSIS — I10 HYPERTENSION GOAL BP (BLOOD PRESSURE) < 140/90: ICD-10-CM

## 2020-02-10 RX ORDER — LISINOPRIL 10 MG/1
TABLET ORAL
Qty: 30 TABLET | Refills: 0 | Status: SHIPPED | OUTPATIENT
Start: 2020-02-10 | End: 2020-03-10

## 2020-02-10 NOTE — TELEPHONE ENCOUNTER
"Requested Prescriptions   Pending Prescriptions Disp Refills     lisinopril (PRINIVIL/ZESTRIL) 10 MG tablet [Pharmacy Med Name: LISINOPRIL 10MG TABS] 30 tablet 0     Sig: TAKE ONE TABLET BY MOUTH ONCE DAILY   Last Written Prescription Date:  1/7/20  Last Fill Quantity: 30,  # refills: 0   Last office visit: 7/23/2019 with prescribing provider:  Richard   Future Office Visit:        ACE Inhibitors (Including Combos) Protocol Failed - 2/8/2020 10:55 AM        Failed - Blood pressure under 140/90 in past 12 months     BP Readings from Last 3 Encounters:   09/05/19 (!) 163/91   08/08/19 (!) 129/100   07/23/19 138/86           Passed - Recent (12 mo) or future (30 days) visit within the authorizing provider's specialty     Patient has had an office visit with the authorizing provider or a provider within the authorizing providers department within the previous 12 mos or has a future within next 30 days. See \"Patient Info\" tab in inbasket, or \"Choose Columns\" in Meds & Orders section of the refill encounter.              Passed - Medication is active on med list        Passed - Patient is age 18 or older        Passed - Normal serum creatinine on file in past 12 months     Recent Labs   Lab Test 07/23/19 0818   CR 0.89           Passed - Normal serum potassium on file in past 12 months     Recent Labs   Lab Test 07/23/19 0818   POTASSIUM 4.0             "

## 2020-02-10 NOTE — TELEPHONE ENCOUNTER
Routing refill request to provider for review/approval because:  Labs out of range:  bp    Camilla Goldman RN on 2/10/2020 at 12:13 PM

## 2020-03-10 DIAGNOSIS — I10 HYPERTENSION GOAL BP (BLOOD PRESSURE) < 140/90: ICD-10-CM

## 2020-03-10 RX ORDER — LISINOPRIL 10 MG/1
TABLET ORAL
Qty: 30 TABLET | Refills: 0 | Status: SHIPPED | OUTPATIENT
Start: 2020-03-10 | End: 2020-04-16

## 2020-03-10 NOTE — TELEPHONE ENCOUNTER
Routing refill request to provider for review/approval because:  Labs out of range:  BP    MUKESH GuzmanN, RN  [facility]

## 2020-03-10 NOTE — TELEPHONE ENCOUNTER
"Requested Prescriptions   Pending Prescriptions Disp Refills     lisinopril (ZESTRIL) 10 MG tablet [Pharmacy Med Name: LISINOPRIL 10MG TABS] 30 tablet 0     Sig: TAKE ONE TABLET BY MOUTH ONCE DAILY   Last Written Prescription Date:  2/10/2020  Last Fill Quantity: 30,  # refills: 0   Last office visit: 7/23/2019 with prescribing provider:  7/23/19   Future Office Visit:        ACE Inhibitors (Including Combos) Protocol Failed - 3/10/2020  8:17 AM        Failed - Blood pressure under 140/90 in past 12 months     BP Readings from Last 3 Encounters:   09/05/19 (!) 163/91   08/08/19 (!) 129/100   07/23/19 138/86                 Passed - Recent (12 mo) or future (30 days) visit within the authorizing provider's specialty     Patient has had an office visit with the authorizing provider or a provider within the authorizing providers department within the previous 12 mos or has a future within next 30 days. See \"Patient Info\" tab in inbasket, or \"Choose Columns\" in Meds & Orders section of the refill encounter.              Passed - Medication is active on med list        Passed - Patient is age 18 or older        Passed - Normal serum creatinine on file in past 12 months     Recent Labs   Lab Test 07/23/19 0818   CR 0.89             Passed - Normal serum potassium on file in past 12 months     Recent Labs   Lab Test 07/23/19 0818   POTASSIUM 4.0                " BONE SCAN WHOLE BODY



CLINICAL HISTORY: M54.12 Cervical [id number] Acute lumbar back painR76.8

   



COMPARISON STUDY:  MRI the lumbar spine dated 10/22/2014, conventional

radiographic views of the lumbar spine dated 8/18/2014



FINDINGS: Patient was injected with 24.9 mCi of technetium 99m MDP. Three-hour

delayed whole body images were acquired.



There is a photopenic defect within the right hip, consistent with a right hip

arthroplasty.



There are foci of increased activity within the wrists knees and feet,

consistent with a degenerative/arthritic etiology.



There is a subtle focus of increased activity at the level of the right

sternoclavicular joint, likely arthritic.



There is an unexplained focus of increased activity at the level of the left

inferomedial SI joint. There are foci of increased activity involving the T11

vertebra, T12 vertebra, as well as at the L1-2 level, L3 level, and L4-5 level.

Plain film correlation is recommended in follow-up.



IMPRESSION:  

1. Unexplained foci of increased activity within the lower thoracic spine and

lumbar spine as well as at the level of the inferior left SI joint. Plain film

correlation is advocated

2. Foci of increased activity within the wrist knees and feet, likely

degenerative/arthritic 









Electronically signed by:  Stephane Thakkar M.D.

1/3/2017 12:02 PM

## 2020-04-15 DIAGNOSIS — I10 HYPERTENSION GOAL BP (BLOOD PRESSURE) < 140/90: ICD-10-CM

## 2020-04-16 RX ORDER — LISINOPRIL 10 MG/1
TABLET ORAL
Qty: 30 TABLET | Refills: 0 | Status: SHIPPED | OUTPATIENT
Start: 2020-04-16 | End: 2020-05-26

## 2020-04-16 NOTE — TELEPHONE ENCOUNTER
Routing refill request to provider for review/approval because:  Labs out of range:  BP  Last Written Prescription Date:  3/10/2020  Last Fill Quantity: 30,  # refills: 0   Last office visit: 7/23/2019 with prescribing provider:     Future Office Visit:      MUKESH PoeN, RN

## 2020-05-23 DIAGNOSIS — I10 HYPERTENSION GOAL BP (BLOOD PRESSURE) < 140/90: ICD-10-CM

## 2020-05-26 RX ORDER — LISINOPRIL 10 MG/1
TABLET ORAL
Qty: 30 TABLET | Refills: 0 | Status: SHIPPED | OUTPATIENT
Start: 2020-05-26 | End: 2020-06-26

## 2020-05-26 NOTE — TELEPHONE ENCOUNTER
Routing refill request to provider for review/approval because:  Labs out of range:  BP    MUKESH GuzmanN, RN  [facility]

## 2020-06-22 ENCOUNTER — OFFICE VISIT (OUTPATIENT)
Dept: FAMILY MEDICINE | Facility: CLINIC | Age: 50
End: 2020-06-22
Payer: COMMERCIAL

## 2020-06-22 VITALS
WEIGHT: 202 LBS | HEART RATE: 78 BPM | TEMPERATURE: 98.7 F | BODY MASS INDEX: 29.05 KG/M2 | DIASTOLIC BLOOD PRESSURE: 88 MMHG | OXYGEN SATURATION: 96 % | RESPIRATION RATE: 18 BRPM | SYSTOLIC BLOOD PRESSURE: 132 MMHG

## 2020-06-22 DIAGNOSIS — M10.9 ACUTE GOUTY ARTHRITIS: Primary | ICD-10-CM

## 2020-06-22 LAB — URATE SERPL-MCNC: 8.8 MG/DL (ref 3.5–7.2)

## 2020-06-22 PROCEDURE — 99213 OFFICE O/P EST LOW 20 MIN: CPT | Performed by: INTERNAL MEDICINE

## 2020-06-22 PROCEDURE — 36415 COLL VENOUS BLD VENIPUNCTURE: CPT | Performed by: INTERNAL MEDICINE

## 2020-06-22 PROCEDURE — 84550 ASSAY OF BLOOD/URIC ACID: CPT | Performed by: INTERNAL MEDICINE

## 2020-06-22 RX ORDER — PREDNISONE 20 MG/1
40 TABLET ORAL DAILY
Qty: 10 TABLET | Refills: 0 | Status: SHIPPED | OUTPATIENT
Start: 2020-06-22 | End: 2020-10-28

## 2020-06-22 ASSESSMENT — PAIN SCALES - GENERAL: PAINLEVEL: MODERATE PAIN (5)

## 2020-06-22 NOTE — NURSING NOTE
Chief Complaint   Patient presents with     Musculoskeletal Problem     bilateral foot pain for about 3 weeks now     MP/MA

## 2020-06-25 NOTE — RESULT ENCOUNTER NOTE
Dear Serafin, your recent test results are attached.  The uric acid has now returned high at 8.8 suggesting an increased likelihood of gout.      You will be contacted with any outstanding results when they are available.  Feel free to contact me via the office or My Chart if you have any questions regarding the above.  Sincerely,  DO INDU Nguyen

## 2020-06-26 ENCOUNTER — MYC MEDICAL ADVICE (OUTPATIENT)
Dept: FAMILY MEDICINE | Facility: CLINIC | Age: 50
End: 2020-06-26

## 2020-06-26 DIAGNOSIS — M10.9 ACUTE GOUTY ARTHRITIS: Primary | ICD-10-CM

## 2020-06-26 DIAGNOSIS — I10 HYPERTENSION GOAL BP (BLOOD PRESSURE) < 140/90: ICD-10-CM

## 2020-06-26 RX ORDER — INDOMETHACIN 50 MG/1
50 CAPSULE ORAL 2 TIMES DAILY WITH MEALS
Qty: 14 CAPSULE | Refills: 0 | Status: SHIPPED | OUTPATIENT
Start: 2020-06-26 | End: 2020-10-28

## 2020-06-26 RX ORDER — LISINOPRIL 10 MG/1
TABLET ORAL
Qty: 90 TABLET | Refills: 0 | Status: SHIPPED | OUTPATIENT
Start: 2020-06-26 | End: 2020-09-24

## 2020-06-26 NOTE — TELEPHONE ENCOUNTER
Routing refill request to provider for review/approval because:  Drug interaction warning    MUKESH GuzmanN, RN  [facility]

## 2020-09-13 ENCOUNTER — ALLIED HEALTH/NURSE VISIT (OUTPATIENT)
Dept: FAMILY MEDICINE | Facility: OTHER | Age: 50
End: 2020-09-13
Payer: COMMERCIAL

## 2020-09-13 DIAGNOSIS — Z23 NEED FOR PROPHYLACTIC VACCINATION AND INOCULATION AGAINST INFLUENZA: Primary | ICD-10-CM

## 2020-09-13 PROCEDURE — 90471 IMMUNIZATION ADMIN: CPT

## 2020-09-13 PROCEDURE — 90682 RIV4 VACC RECOMBINANT DNA IM: CPT

## 2020-09-13 PROCEDURE — 99207 ZZC NO CHARGE NURSE ONLY: CPT

## 2020-09-23 DIAGNOSIS — I10 HYPERTENSION GOAL BP (BLOOD PRESSURE) < 140/90: ICD-10-CM

## 2020-09-24 RX ORDER — LISINOPRIL 10 MG/1
TABLET ORAL
Qty: 90 TABLET | Refills: 0 | Status: SHIPPED | OUTPATIENT
Start: 2020-09-24 | End: 2020-12-31

## 2020-09-24 NOTE — TELEPHONE ENCOUNTER
Routing refill request to provider for review/approval because:  Labs not current:  Creatinine, Potassium    WENDI Guzman, RN  [facility]

## 2020-10-27 ASSESSMENT — ENCOUNTER SYMPTOMS
CHILLS: 0
HEADACHES: 0
SORE THROAT: 0
FEVER: 0
PALPITATIONS: 0
NERVOUS/ANXIOUS: 0
DIZZINESS: 0
JOINT SWELLING: 1
ABDOMINAL PAIN: 0
EYE PAIN: 0
HEARTBURN: 0
MYALGIAS: 0
PARESTHESIAS: 0
NAUSEA: 0
FREQUENCY: 0
ARTHRALGIAS: 1
HEMATURIA: 0
DIARRHEA: 0
DYSURIA: 0
HEMATOCHEZIA: 0
WEAKNESS: 0
CONSTIPATION: 0
COUGH: 0
SHORTNESS OF BREATH: 0

## 2020-10-28 ENCOUNTER — OFFICE VISIT (OUTPATIENT)
Dept: INTERNAL MEDICINE | Facility: CLINIC | Age: 50
End: 2020-10-28
Payer: COMMERCIAL

## 2020-10-28 VITALS
RESPIRATION RATE: 16 BRPM | SYSTOLIC BLOOD PRESSURE: 146 MMHG | TEMPERATURE: 97.4 F | HEART RATE: 80 BPM | HEIGHT: 70 IN | WEIGHT: 205 LBS | DIASTOLIC BLOOD PRESSURE: 70 MMHG | OXYGEN SATURATION: 100 % | BODY MASS INDEX: 29.35 KG/M2

## 2020-10-28 DIAGNOSIS — Z00.00 ROUTINE GENERAL MEDICAL EXAMINATION AT A HEALTH CARE FACILITY: Primary | ICD-10-CM

## 2020-10-28 DIAGNOSIS — E78.5 HYPERLIPIDEMIA LDL GOAL <130: ICD-10-CM

## 2020-10-28 DIAGNOSIS — M10.9 ACUTE GOUTY ARTHRITIS: ICD-10-CM

## 2020-10-28 DIAGNOSIS — I10 HYPERTENSION GOAL BP (BLOOD PRESSURE) < 140/90: ICD-10-CM

## 2020-10-28 DIAGNOSIS — Z23 NEED FOR VACCINATION: ICD-10-CM

## 2020-10-28 DIAGNOSIS — Z12.5 SCREENING FOR PROSTATE CANCER: ICD-10-CM

## 2020-10-28 LAB
ALBUMIN SERPL-MCNC: 4.1 G/DL (ref 3.4–5)
ALP SERPL-CCNC: 61 U/L (ref 40–150)
ALT SERPL W P-5'-P-CCNC: 38 U/L (ref 0–70)
ANION GAP SERPL CALCULATED.3IONS-SCNC: 6 MMOL/L (ref 3–14)
AST SERPL W P-5'-P-CCNC: 26 U/L (ref 0–45)
BASOPHILS # BLD AUTO: 0 10E9/L (ref 0–0.2)
BASOPHILS NFR BLD AUTO: 0.7 %
BILIRUB SERPL-MCNC: 0.9 MG/DL (ref 0.2–1.3)
BUN SERPL-MCNC: 11 MG/DL (ref 7–30)
CALCIUM SERPL-MCNC: 9.1 MG/DL (ref 8.5–10.1)
CHLORIDE SERPL-SCNC: 103 MMOL/L (ref 94–109)
CHOLEST SERPL-MCNC: 181 MG/DL
CO2 SERPL-SCNC: 30 MMOL/L (ref 20–32)
CREAT SERPL-MCNC: 0.92 MG/DL (ref 0.66–1.25)
DIFFERENTIAL METHOD BLD: ABNORMAL
EOSINOPHIL NFR BLD AUTO: 1.2 %
ERYTHROCYTE [DISTWIDTH] IN BLOOD BY AUTOMATED COUNT: 12.1 % (ref 10–15)
GFR SERPL CREATININE-BSD FRML MDRD: >90 ML/MIN/{1.73_M2}
GLUCOSE SERPL-MCNC: 110 MG/DL (ref 70–99)
HCT VFR BLD AUTO: 43.4 % (ref 40–53)
HDLC SERPL-MCNC: 44 MG/DL
HGB BLD-MCNC: 15.6 G/DL (ref 13.3–17.7)
IMM GRANULOCYTES # BLD: 0 10E9/L (ref 0–0.4)
IMM GRANULOCYTES NFR BLD: 0.5 %
LDLC SERPL CALC-MCNC: 68 MG/DL
LYMPHOCYTES # BLD AUTO: 1.1 10E9/L (ref 0.8–5.3)
LYMPHOCYTES NFR BLD AUTO: 25.9 %
MCH RBC QN AUTO: 32.7 PG (ref 26.5–33)
MCHC RBC AUTO-ENTMCNC: 35.9 G/DL (ref 31.5–36.5)
MCV RBC AUTO: 91 FL (ref 78–100)
MONOCYTES # BLD AUTO: 0.4 10E9/L (ref 0–1.3)
MONOCYTES NFR BLD AUTO: 9.1 %
NEUTROPHILS # BLD AUTO: 2.7 10E9/L (ref 1.6–8.3)
NEUTROPHILS NFR BLD AUTO: 62.6 %
NONHDLC SERPL-MCNC: 137 MG/DL
NRBC # BLD AUTO: 0 10*3/UL
NRBC BLD AUTO-RTO: 0 /100
PLATELET # BLD AUTO: 145 10E9/L (ref 150–450)
POTASSIUM SERPL-SCNC: 4.4 MMOL/L (ref 3.4–5.3)
PROT SERPL-MCNC: 7.5 G/DL (ref 6.8–8.8)
PSA SERPL-ACNC: 1.19 UG/L (ref 0–4)
RBC # BLD AUTO: 4.77 10E12/L (ref 4.4–5.9)
SODIUM SERPL-SCNC: 139 MMOL/L (ref 133–144)
TRIGL SERPL-MCNC: 346 MG/DL
URATE SERPL-MCNC: 8.2 MG/DL (ref 3.5–7.2)
WBC # BLD AUTO: 4.3 10E9/L (ref 4–11)

## 2020-10-28 PROCEDURE — 90471 IMMUNIZATION ADMIN: CPT | Performed by: INTERNAL MEDICINE

## 2020-10-28 PROCEDURE — 90750 HZV VACC RECOMBINANT IM: CPT | Performed by: INTERNAL MEDICINE

## 2020-10-28 PROCEDURE — 85025 COMPLETE CBC W/AUTO DIFF WBC: CPT | Performed by: INTERNAL MEDICINE

## 2020-10-28 PROCEDURE — 84550 ASSAY OF BLOOD/URIC ACID: CPT | Performed by: INTERNAL MEDICINE

## 2020-10-28 PROCEDURE — 90715 TDAP VACCINE 7 YRS/> IM: CPT | Performed by: INTERNAL MEDICINE

## 2020-10-28 PROCEDURE — G0103 PSA SCREENING: HCPCS | Performed by: INTERNAL MEDICINE

## 2020-10-28 PROCEDURE — 80061 LIPID PANEL: CPT | Performed by: INTERNAL MEDICINE

## 2020-10-28 PROCEDURE — 90472 IMMUNIZATION ADMIN EACH ADD: CPT | Performed by: INTERNAL MEDICINE

## 2020-10-28 PROCEDURE — 36415 COLL VENOUS BLD VENIPUNCTURE: CPT | Performed by: INTERNAL MEDICINE

## 2020-10-28 PROCEDURE — 80053 COMPREHEN METABOLIC PANEL: CPT | Performed by: INTERNAL MEDICINE

## 2020-10-28 PROCEDURE — 99396 PREV VISIT EST AGE 40-64: CPT | Mod: 25 | Performed by: INTERNAL MEDICINE

## 2020-10-28 RX ORDER — PREDNISONE 20 MG/1
TABLET ORAL
Qty: 30 TABLET | Refills: 0 | Status: SHIPPED | OUTPATIENT
Start: 2020-10-28 | End: 2021-04-23

## 2020-10-28 ASSESSMENT — ENCOUNTER SYMPTOMS
CHILLS: 0
FEVER: 0
PARESTHESIAS: 0
ABDOMINAL PAIN: 0
HEMATOCHEZIA: 0
HEARTBURN: 0
DYSURIA: 0
CONSTIPATION: 0
NERVOUS/ANXIOUS: 0
NAUSEA: 0
HEMATURIA: 0
SHORTNESS OF BREATH: 0
PALPITATIONS: 0
HEADACHES: 0
EYE PAIN: 0
SORE THROAT: 0
ARTHRALGIAS: 1
DIARRHEA: 0
FREQUENCY: 0
MYALGIAS: 0
DIZZINESS: 0
WEAKNESS: 0
COUGH: 0
JOINT SWELLING: 1

## 2020-10-28 ASSESSMENT — PAIN SCALES - GENERAL: PAINLEVEL: SEVERE PAIN (6)

## 2020-10-28 ASSESSMENT — MIFFLIN-ST. JEOR: SCORE: 1782.95

## 2020-10-28 NOTE — PROGRESS NOTES
SUBJECTIVE:   CC: Serafin Hurd is an 50 year old male who presents for preventative health visit.     Patient has been advised of split billing requirements and indicates understanding: Yes  Healthy Habits:     Getting at least 3 servings of Calcium per day:  Yes    Bi-annual eye exam:  Yes    Dental care twice a year:  Yes    Sleep apnea or symptoms of sleep apnea:  None    Diet:  Regular (no restrictions)    Frequency of exercise:  1 day/week    Duration of exercise:  Less than 15 minutes    Taking medications regularly:  Yes    Medication side effects:  Not applicable    PHQ-2 Total Score: 0    Additional concerns today:  No             EMR reviewed including: History of chief complaint, pertinent distant history, medications, concurrent diagnoses.             Chief Complaint:  #1   Patient has an exacerbation of gouty arthritis in his right toe.  It is red, swollen, painful.  He notes no trauma.  He does have a history of gout.  It has been this way for about the last week.  No other joint inflammation is noted at this time.    Today's PHQ-2 Score:   PHQ-2 ( 1999 Pfizer) 10/27/2020   Q1: Little interest or pleasure in doing things 0   Q2: Feeling down, depressed or hopeless 0   PHQ-2 Score 0   Q1: Little interest or pleasure in doing things Not at all   Q2: Feeling down, depressed or hopeless Not at all   PHQ-2 Score 0       Abuse: Current or Past(Physical, Sexual or Emotional)- No  Do you feel safe in your environment? Yes        Social History     Tobacco Use     Smoking status: Never Smoker     Smokeless tobacco: Former User   Substance Use Topics     Alcohol use: Yes     Alcohol/week: 0.0 standard drinks     Comment: Social     If you drink alcohol do you typically have >3 drinks per day or >7 drinks per week? No    Alcohol Use 10/27/2020   Prescreen: >3 drinks/day or >7 drinks/week? No   Prescreen: >3 drinks/day or >7 drinks/week? -   AUDIT SCORE  -       Last PSA: No results found for: PSA    Reviewed  orders with patient. Reviewed health maintenance and updated orders accordingly - Yes  Lab work is in process  BP Readings from Last 3 Encounters:   10/28/20 (!) 146/70   06/22/20 132/88   09/05/19 (!) 163/91    Wt Readings from Last 3 Encounters:   10/28/20 93 kg (205 lb)   06/22/20 91.6 kg (202 lb)   07/23/19 94.4 kg (208 lb 1.6 oz)                  Patient Active Problem List   Diagnosis     Deviated nasal septum     CARDIOVASCULAR SCREENING; LDL GOAL LESS THAN 160     Hypertension goal BP (blood pressure) < 140/90     Iliotibial band syndrome - bilateral     Other hydrocele     Past Surgical History:   Procedure Laterality Date     HYDROCELECTOMY SCROTAL Left 8/8/2019    Procedure: hydrocelectomy;  Surgeon: Scott Silva MD;  Location: [facility]     REMOVAL OF SPERM DUCT(S)      Vasectomy       Social History     Tobacco Use     Smoking status: Never Smoker     Smokeless tobacco: Former User   Substance Use Topics     Alcohol use: Yes     Alcohol/week: 0.0 standard drinks     Comment: Social     Family History   Problem Relation Age of Onset     Depression Mother      C.A.D. Paternal Uncle          Current Outpatient Medications   Medication Sig Dispense Refill     lisinopril (ZESTRIL) 10 MG tablet TAKE ONE TABLET BY MOUTH ONCE DAILY 90 tablet 0     predniSONE (DELTASONE) 20 MG tablet 2 pills a day for 5 days. 30 tablet 0     XALATAN SOLN 0.005 % OP 1 DROP EACH EYE DAILY       Allergies   Allergen Reactions     No Known Drug Allergies        Reviewed and updated as needed this visit by clinical staff  Tobacco  Allergies  Meds   Med Hx  Surg Hx  Fam Hx  Soc Hx        Reviewed and updated as needed this visit by Provider                Past Medical History:   Diagnosis Date     Hypertension      Unspecified glaucoma(365.9) 1998      Past Surgical History:   Procedure Laterality Date     HYDROCELECTOMY SCROTAL Left 8/8/2019    Procedure: hydrocelectomy;  Surgeon: Scott Silva MD;  Location:  OR      REMOVAL OF SPERM DUCT(S)      Vasectomy       Review of Systems   Constitutional: Negative for chills and fever.   HENT: Negative for congestion, ear pain, hearing loss and sore throat.    Eyes: Negative for pain and visual disturbance.   Respiratory: Negative for cough and shortness of breath.    Cardiovascular: Negative for chest pain, palpitations and peripheral edema.   Gastrointestinal: Negative for abdominal pain, constipation, diarrhea, heartburn, hematochezia and nausea.   Genitourinary: Negative for discharge, dysuria, frequency, genital sores, hematuria, impotence and urgency.   Musculoskeletal: Positive for arthralgias and joint swelling. Negative for myalgias.   Skin: Negative for rash.   Neurological: Negative for dizziness, weakness, headaches and paresthesias.   Psychiatric/Behavioral: Negative for mood changes. The patient is not nervous/anxious.      CONSTITUTIONAL: NEGATIVE for fever, chills, change in weight  INTEGUMENTARY/SKIN: NEGATIVE for worrisome rashes, moles or lesions  EYES: NEGATIVE for vision changes or irritation  ENT: NEGATIVE for ear, mouth and throat problems  RESP: NEGATIVE for significant cough or SOB  CV: NEGATIVE for chest pain, palpitations or peripheral edema  GI: NEGATIVE for nausea, abdominal pain, heartburn, or change in bowel habits   male: negative for dysuria, hematuria, decreased urinary stream, erectile dysfunction, urethral discharge  MUSCULOSKELETAL: Does have exacerbation of gout in the right toe as noted.  NEURO: NEGATIVE for weakness, dizziness or paresthesias  PSYCHIATRIC: NEGATIVE for changes in mood or affect    OBJECTIVE:   BP (!) 146/70 (BP Location: Right arm, Patient Position: Sitting, Cuff Size: Adult Large)   Pulse 80   Temp 97.4  F (36.3  C) (Temporal)   Resp 16   Wt 93 kg (205 lb)   SpO2 100%   BMI 29.48 kg/m      Physical Exam  GENERAL: healthy, alert and no distress  EYES: Eyes grossly normal to inspection, PERRL and conjunctivae and sclerae  normal  HENT: ear canals and TM's normal, nose and mouth without ulcers or lesions  NECK: no adenopathy, no asymmetry, masses, or scars and thyroid normal to palpation  RESP: lungs clear to auscultation - no rales, rhonchi or wheezes  CV: regular rate and rhythm, normal S1 S2, no S3 or S4, no murmur, click or rub, no peripheral edema and peripheral pulses strong  ABDOMEN: soft, nontender, no hepatosplenomegaly, no masses and bowel sounds normal  MS: no gross musculoskeletal defects noted, no edema  SKIN: no suspicious lesions or rashes  NEURO: Normal strength and tone, mentation intact and speech normal  PSYCH: mentation appears normal, affect normal/bright    Diagnostic Test Results:  Results for orders placed or performed in visit on 10/28/20 (from the past 24 hour(s))   CBC with platelets and differential   Result Value Ref Range    WBC 4.3 4.0 - 11.0 10e9/L    RBC Count 4.77 4.4 - 5.9 10e12/L    Hemoglobin 15.6 13.3 - 17.7 g/dL    Hematocrit 43.4 40.0 - 53.0 %    MCV 91 78 - 100 fl    MCH 32.7 26.5 - 33.0 pg    MCHC 35.9 31.5 - 36.5 g/dL    RDW 12.1 10.0 - 15.0 %    Platelet Count 145 (L) 150 - 450 10e9/L    Diff Method Automated Method     % Neutrophils 62.6 %    % Lymphocytes 25.9 %    % Monocytes 9.1 %    % Eosinophils 1.2 %    % Basophils 0.7 %    % Immature Granulocytes 0.5 %    Nucleated RBCs 0 0 /100    Absolute Neutrophil 2.7 1.6 - 8.3 10e9/L    Absolute Lymphocytes 1.1 0.8 - 5.3 10e9/L    Absolute Monocytes 0.4 0.0 - 1.3 10e9/L    Absolute Basophils 0.0 0.0 - 0.2 10e9/L    Abs Immature Granulocytes 0.0 0 - 0.4 10e9/L    Absolute Nucleated RBC 0.0        ASSESSMENT/PLAN:   1. Routine general medical examination at a health care facility    2. Acute gouty arthritis  - predniSONE (DELTASONE) 20 MG tablet; 2 pills a day for 5 days.  Dispense: 30 tablet; Refill: 0  - CBC with platelets and differential  - Uric acid    3. Hypertension goal BP (blood pressure) < 140/90  - Comprehensive metabolic panel (BMP +  "Alb, Alk Phos, ALT, AST, Total. Bili, TP)    4. Hyperlipidemia LDL goal <130  - Lipid panel reflex to direct LDL Fasting    5. Screening for prostate cancer  - PSA, screen    6. Need for vaccination  - TDAP VACCINE (Adacel, Boostrix)  [[id number]]  - SHINGRIX [14218]  - 1st  Administration  [[id number]]  - Each additional admin.  (Right click and add QUANTITY)  [[id number]]    Patient has been advised of split billing requirements and indicates understanding: Yes  COUNSELING:   Reviewed preventive health counseling, as reflected in patient instructions       Regular exercise       Healthy diet/nutrition       Vision screening       Hearing screening    Estimated body mass index is 29.48 kg/m  as calculated from the following:    Height as of 6/25/19: 1.776 m (5' 9.92\").    Weight as of this encounter: 93 kg (205 lb).     Weight management plan: Discussed healthy diet and exercise guidelines    He reports that he has never smoked. He has quit using smokeless tobacco.      Counseling Resources:  ATP IV Guidelines  Pooled Cohorts Equation Calculator  FRAX Risk Assessment  ICSI Preventive Guidelines  Dietary Guidelines for Americans, 2010  USDA's MyPlate  ASA Prophylaxis  Lung CA Screening    Beto Ramos DO  [facility]  "

## 2020-10-28 NOTE — NURSING NOTE
Prior to immunization administration, verified patients identity using patient s name and date of birth. Please see Immunization Activity for additional information.     Screening Questionnaire for Adult Immunization    Are you sick today?   No   Do you have allergies to medications, food, a vaccine component or latex?   No   Have you ever had a serious reaction after receiving a vaccination?   No   Do you have a long-term health problem with heart, lung, kidney, or metabolic disease (e.g., diabetes), asthma, a blood disorder, no spleen, complement component deficiency, a cochlear implant, or a spinal fluid leak?  Are you on long-term aspirin therapy?   No   Do you have cancer, leukemia, HIV/AIDS, or any other immune system problem?   No   Do you have a parent, brother, or sister with an immune system problem?   No   In the past 3 months, have you taken medications that affect  your immune system, such as prednisone, other steroids, or anticancer drugs; drugs for the treatment of rheumatoid arthritis, Crohn s disease, or psoriasis; or have you had radiation treatments?   No   Have you had a seizure, or a brain or other nervous system problem?   No   During the past year, have you received a transfusion of blood or blood    products, or been given immune (gamma) globulin or antiviral drug?   No   For women: Are you pregnant or is there a chance you could become       pregnant during the next month?   No   Have you received any vaccinations in the past 4 weeks?   No     Immunization questionnaire answers were all negative.      Patient instructed to remain in clinic for 15 minutes afterwards, and to report any adverse reaction to me immediately.       Screening performed by Carolin Castaneda CMA on 10/28/2020 at 7:08 AM.

## 2020-10-29 NOTE — RESULT ENCOUNTER NOTE
Dear Serafin, your recent test results are attached.    The PSA is consistent with a low risk of prostate cancer.  Chemistry panel is essentially normal with an elevated blood sugar of 110 which is a very questionable significance.  Liver tests are normal.  Cholesterol is well controlled with an LDL of 68.  Uric acid/the gout test is somewhat elevated at 8.2.  Blood cell count is normal without irregularity or evidence of anemia or leukemia.    You will be contacted with any outstanding results when they are available.  Feel free to contact me via the office or My Chart if you have any questions regarding the above.  Sincerely,  DO FRANCA NguyenOI

## 2020-12-29 DIAGNOSIS — I10 HYPERTENSION GOAL BP (BLOOD PRESSURE) < 140/90: ICD-10-CM

## 2020-12-31 RX ORDER — LISINOPRIL 10 MG/1
TABLET ORAL
Qty: 90 TABLET | Refills: 0 | Status: SHIPPED | OUTPATIENT
Start: 2020-12-31 | End: 2021-04-09

## 2020-12-31 NOTE — TELEPHONE ENCOUNTER
Routing refill request to provider for review/approval because:  Labs out of range:  BP    MUKESH GuzmanN, RN  [facility]

## 2021-01-06 ENCOUNTER — OFFICE VISIT (OUTPATIENT)
Dept: INTERNAL MEDICINE | Facility: CLINIC | Age: 51
End: 2021-01-06
Payer: COMMERCIAL

## 2021-01-06 VITALS
RESPIRATION RATE: 16 BRPM | WEIGHT: 213 LBS | SYSTOLIC BLOOD PRESSURE: 138 MMHG | HEART RATE: 90 BPM | OXYGEN SATURATION: 98 % | DIASTOLIC BLOOD PRESSURE: 86 MMHG | HEIGHT: 69 IN | TEMPERATURE: 97.8 F | BODY MASS INDEX: 31.55 KG/M2

## 2021-01-06 DIAGNOSIS — M76.61 ACHILLES TENDINITIS OF RIGHT LOWER EXTREMITY: ICD-10-CM

## 2021-01-06 DIAGNOSIS — Z23 NEED FOR VACCINATION: Primary | ICD-10-CM

## 2021-01-06 PROCEDURE — 90471 IMMUNIZATION ADMIN: CPT | Performed by: INTERNAL MEDICINE

## 2021-01-06 PROCEDURE — 99213 OFFICE O/P EST LOW 20 MIN: CPT | Mod: 25 | Performed by: INTERNAL MEDICINE

## 2021-01-06 PROCEDURE — 90750 HZV VACC RECOMBINANT IM: CPT | Performed by: INTERNAL MEDICINE

## 2021-01-06 RX ORDER — MELOXICAM 15 MG/1
15 TABLET ORAL DAILY
Qty: 30 TABLET | Refills: 0 | Status: SHIPPED | OUTPATIENT
Start: 2021-01-06 | End: 2021-09-09

## 2021-01-06 ASSESSMENT — PAIN SCALES - GENERAL: PAINLEVEL: EXTREME PAIN (9)

## 2021-01-06 ASSESSMENT — MIFFLIN-ST. JEOR: SCORE: 1806.54

## 2021-01-06 NOTE — NURSING NOTE
Prior to injection, verified patient identity using patient's name and date of birth.  Due to injection administration, patient instructed to remain in clinic for 15 minutes  afterwards, and to report any adverse reaction to me immediately.    Screening Questionnaire for Adult Immunization     Are you sick today?   No    Do you have allergies to medications, food or any vaccine?   No    Have you ever had a serious reaction after receiving a vaccination?   No    Do you have a long-term health problem with heart disease, lung disease,  asthma, kidney disease, diabetes, anemia, metabolic or blood disease?   No    Do you have cancer, leukemia, AIDS, or any immune system problem?   No    Do you take cortisone, prednisone, other steroids, or anticancer drugs, or  have you had any x-ray (radiation) treatments?   Prednisone     Have you had a seizure, brain, or other nervous system problem?   No    During the past year, have you received a transfusion of blood or blood       products, or been given a medicine called immune (gamma) globulin?   No    For women: Are you pregnant or is there a chance you could become         pregnant during the next month?   No    Have you received any vaccinations in the past 4 weeks?   No     Immunization questionnaire answers were all negative.      MNVFC doesn't apply on this patient    Per orders of Dr. Ramos, injection of Shinrix was given by Maritza Pena MA. Patient instructed to remain in clinic for 20 minutes afterwards, and to report any adverse reaction to me immediately.       Screening performed by Maritza Pena MA on 1/6/2021 at 2:49 PM.

## 2021-01-06 NOTE — PROGRESS NOTES
"   EMR reviewed including:             Complaint, History of Chief Complaint, Corresponding Review of Systems, and Complaint Specific Physical Examination.    #1   Severe pain in heel of right foot  History of gout and foot.  Usually anterior foot at MP joint  Took prednisone for current complaint with no response.  Pain described at back of heel near insertion of Achilles.  Pain worse with dorsiflexion of foot or ambulation.  No history of trauma or injury.  Exam: Distal/insertion of Achilles tendon warm, red and inflamed.  Easily reproducible with palpation.    #2   Needs Shingrix No. 2.   /86 (BP Location: Right arm, Patient Position: Sitting, Cuff Size: Adult Regular)   Pulse 90   Temp 97.8  F (36.6  C) (Temporal)   Resp 16   Ht 1.753 m (5' 9\")   Wt 96.6 kg (213 lb)   SpO2 98%   BMI 31.45 kg/m               Decision Making    Problem and Complexity     1. Achilles tendinitis of right lower extremity  Suspect tendinopathy of insertion.  Will start on meloxicam.  Recommend stretching exercises.  We will set up with podiatry for evaluation of this and chronic foot pain.  - Orthopedic & Spine  Referral; Future  - meloxicam (MOBIC) 15 MG tablet; Take 1 tablet (15 mg) by mouth daily  Dispense: 30 tablet; Refill: 0    2. Need for vaccination  Given  - SHINGRIX [[id number]]          ------------------------------------------------------------------------------------------------------------------------------  Data  1  Specialty (external) notes reviewed:   None    Tests reviewed:   None     Tests ordered:   None    Independent Historians Interview:   None    2  Review of outside Tests:   None    3   Verbal Discussion with Specialists:    None      ----------------------------------------------------------------------------------------------------------------------------------  Risk   Prescription drug management:   Ordering NSAID                                High risk for toxicity:     Decision " regarding surgery:    None     Social determinants of health:   None     Decision to withhold therapy:   None                                   FOLLOW UP   I have asked the patient to make an appointment for followup with me in 1 month            I have carefully explained the diagnosis and treatment options to the patient.  The patient has displayed an understanding of the above, and all subsequent questions were answered.      DO INDU Nguyen    Portions of this note were produced using [employer])  Although every attempt at real-time proof reading has been made, occasional grammar/syntax errors may have been missed.

## 2021-01-13 ENCOUNTER — ANCILLARY PROCEDURE (OUTPATIENT)
Dept: GENERAL RADIOLOGY | Facility: CLINIC | Age: 51
End: 2021-01-13
Attending: PODIATRIST
Payer: COMMERCIAL

## 2021-01-13 ENCOUNTER — OFFICE VISIT (OUTPATIENT)
Dept: PODIATRY | Facility: CLINIC | Age: 51
End: 2021-01-13
Attending: INTERNAL MEDICINE
Payer: COMMERCIAL

## 2021-01-13 VITALS
HEIGHT: 69 IN | SYSTOLIC BLOOD PRESSURE: 132 MMHG | BODY MASS INDEX: 31.1 KG/M2 | DIASTOLIC BLOOD PRESSURE: 84 MMHG | WEIGHT: 210 LBS

## 2021-01-13 DIAGNOSIS — Q66.70 CAVUS DEFORMITY: Primary | ICD-10-CM

## 2021-01-13 DIAGNOSIS — M76.61 ACHILLES TENDINITIS OF RIGHT LOWER EXTREMITY: ICD-10-CM

## 2021-01-13 PROCEDURE — 73630 X-RAY EXAM OF FOOT: CPT | Mod: TC | Performed by: RADIOLOGY

## 2021-01-13 PROCEDURE — 99203 OFFICE O/P NEW LOW 30 MIN: CPT | Performed by: PODIATRIST

## 2021-01-13 ASSESSMENT — PAIN SCALES - GENERAL: PAINLEVEL: MODERATE PAIN (5)

## 2021-01-13 ASSESSMENT — MIFFLIN-ST. JEOR: SCORE: 1792.93

## 2021-01-13 NOTE — PATIENT INSTRUCTIONS
[email or web address]         Reliable shoe stores: To maximize your experience and provide the best possible fit.  Be sure to show them your foot concerns and tell them Dr. Ng sent you.      Stores listed in bold have only athletic shoes, and stores that are not bold are mostly casual or variety of shoes    [employer]  [address] [place] [address]  [phone]     [employer] [place]  [address] [place] [address]  [phone]     [employer] [place]  [address] [place] [address]  [phone]    [employer]  [address] [facility]  [place] [facility] [address]  [phone]    [employer]  [address] [place] [address]  [phone]    [employer]  [address] [place] [address]  [phone]                         [employer] Locations:     [address] [place] [address]   [phone]     [address] [place] [address] [address] [place] [address]   [phone]     [address] [place] [address]   [phone]     [address] [facility] [place] [address]   [phone]     [address] [place] [address] [place] [address]   [phone]     [address] [place] [address]   [phone]     [address]  [facility] [address]   [place] [address]   [phone]     [address] [facility]. [address]   [place] [address]   [phone]             How to find reasonable shoes          The correct width    Correct Fitting    Correct Length      Foot Distortion    Posture Distortion                          Torsional Rigidity      Grasp behind the heel and underneath the foot and twist      Bad    Excessive torsion/twist in midfoot     Less torsion/twist in midfoot is better                   Heel Counter Rigidity      Grasp just above   midsole and squeeze      Bad    Soft heel counter      Good    Rigid Heel Counter      Flexion Rigidity      Grasp shoe and bend from forefoot to rearfoot

## 2021-01-13 NOTE — LETTER
1/13/2021         RE: Serafin Hurd  [address] [facility] [id number]        Dear Colleague,    Thank you for referring your patient, Serafin Hurd, to the [facility]. Please see a copy of my visit note below.    HPI:  Serafin Hurd is a 50 year old male who is seen in consultation at the request of Beto Ramos DO    Pt presents for eval of:   (Onset, Location, L/R, Character, Treatments, Injury if yes)    XR Right foot 1/13/2021     Onset 12/31/2020, posterior Right achilles pain. No know injury. Presents today with supportive athletic shoes.  Constant, dull ache. With each step sharp, stabbing pain 5-8/10  Treated for gout three times over 2020.  Mobic and predisone have decreased the pain.    Works as a principal at [employer].    Weight management plan: Patient was referred to their PCP to discuss a diet and exercise plan.     ROS:  10 point ROS neg other than the symptoms noted above in the HPI.    Patient Active Problem List   Diagnosis     Deviated nasal septum     CARDIOVASCULAR SCREENING; LDL GOAL LESS THAN 160     Hypertension goal BP (blood pressure) < 140/90     Iliotibial band syndrome - bilateral     Other hydrocele       PAST MEDICAL HISTORY:   Past Medical History:   Diagnosis Date     Hypertension      Unspecified glaucoma(365.9) 1998        PAST SURGICAL HISTORY:   Past Surgical History:   Procedure Laterality Date     HYDROCELECTOMY SCROTAL Left 8/8/2019    Procedure: hydrocelectomy;  Surgeon: Scott Silva MD;  Location: [facility]     REMOVAL OF SPERM DUCT(S)      Vasectomy        MEDICATIONS:   Current Outpatient Medications:      lisinopril (ZESTRIL) 10 MG tablet, TAKE ONE TABLET BY MOUTH ONCE DAILY, Disp: 90 tablet, Rfl: 0     meloxicam (MOBIC) 15 MG tablet, Take 1 tablet (15 mg) by mouth daily, Disp: 30 tablet, Rfl: 0     predniSONE (DELTASONE) 20 MG tablet, 2 pills a day for 5 days., Disp: 30 tablet, Rfl: 0     XALATAN SOLN 0.005 %  "OP, 1 DROP EACH EYE DAILY, Disp: , Rfl:      ALLERGIES:    Allergies   Allergen Reactions     No Known Drug Allergies         SOCIAL HISTORY:   Social History     Socioeconomic History     Marital status:      Spouse name: Not on file     Number of children: Not on file     Years of education: Not on file     Highest education level: Not on file   Occupational History     Not on file   Social Needs     Financial resource strain: Not on file     Food insecurity     Worry: Not on file     Inability: Not on file     Transportation needs     Medical: Not on file     Non-medical: Not on file   Tobacco Use     Smoking status: Never Smoker     Smokeless tobacco: Former User   Substance and Sexual Activity     Alcohol use: Yes     Alcohol/week: 0.0 standard drinks     Comment: Social     Drug use: No     Sexual activity: Yes     Partners: Female     Birth control/protection: Surgical   Lifestyle     Physical activity     Days per week: Not on file     Minutes per session: Not on file     Stress: Not on file   Relationships     Social connections     Talks on phone: Not on file     Gets together: Not on file     Attends Mandaen service: Not on file     Active member of club or organization: Not on file     Attends meetings of clubs or organizations: Not on file     Relationship status: Not on file     Intimate partner violence     Fear of current or ex partner: Not on file     Emotionally abused: Not on file     Physically abused: Not on file     Forced sexual activity: Not on file   Other Topics Concern     Parent/sibling w/ CABG, MI or angioplasty before 65F 55M? No   Social History Narrative     Not on file        FAMILY HISTORY:   Family History   Problem Relation Age of Onset     Depression Mother      C.A.D. Paternal Uncle         EXAM:Vitals: /84 (BP Location: Left arm, Patient Position: Sitting, Cuff Size: Adult Regular)   Ht 1.753 m (5' 9\")   Wt 95.3 kg (210 lb)   BMI 31.01 kg/m    BMI= Body mass " index is 31.01 kg/m .    General appearance: Patient is alert and fully cooperative with history & exam.  No sign of distress is noted during the visit.     Psychiatric: Affect is pleasant & appropriate.  Patient appears motivated to improve health.     Respiratory: Breathing is regular & unlabored while sitting.     HEENT: Hearing is intact to spoken word.  Speech is clear.  No gross evidence of visual impairment that would impact ambulation.     Vascular: DP & PT pulses are intact & regular bilaterally.  No significant edema or varicosities noted.  CFT and skin temperature is normal to both lower extremities.     Neurologic: Lower extremity sensation is intact to light touch.  No evidence of weakness or contracture in the lower extremities.  No evidence of neuropathy.    Dermatologic: Skin is intact to both lower extremities with adequate texture, turgor and tone about the integument.  No paronychia or evidence of soft tissue infection is noted.     Musculoskeletal: Patient is ambulatory without assistive device or brace.  There is gross prominence about the insertion of the Achilles tendon right greater than left at the superior lateral aspect of the insertion of the Achilles on the right calcaneus.  This prominence is noted on the left but only symptoms on the right.  Manual muscle strength is 5/5 to all 4 quadrants.  No edema is noted throughout the foot and ankle.  No pain through the watershed area of the right Achilles tendon.  Manual muscle strength equal bilateral.    Radiographs: 3 views right demonstrate osteophyte at the insertion of the Achilles tendon consistent with chronic changes.    ASSESSMENT:       ICD-10-CM    1. 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 Achilles tendinitis of right lower extremity  M76.61 Orthopedic & Spine  Referral     XR Foot Right G/E 3 Views     ORTHOTICS REFERRAL     Ankle/Foot Bracing Supplies Order for DME - ONLY FOR DME        PLAN:  Reviewed patient's chart in epic.       1/13/2021   Obtained interpreted radiographs  Dispensed a night splint to improve conditioning and range of motion of the Achilles tendon  Recommended stretching at home  Recommended appropriate activities and shoe gear to avoid overuse of the Achilles tendon  Continue oral anti-inflammatory Mobic  Discussed injection and surgical treatments and risk of rupture  Order for custom molded orthotics at his request with high arched cavus foot type.  Follow-up again in 1 month    Matthias Ng DPM        Again, thank you for allowing me to participate in the care of your patient.        Sincerely,        Matthias Ng DPM

## 2021-01-13 NOTE — PROGRESS NOTES
HPI:  Serafin Hurd is a 50 year old male who is seen in consultation at the request of Beto Ramos DO    Pt presents for eval of:   (Onset, Location, L/R, Character, Treatments, Injury if yes)    XR Right foot 1/13/2021     Onset 12/31/2020, posterior Right achilles pain. No know injury. Presents today with supportive athletic shoes.  Constant, dull ache. With each step sharp, stabbing pain 5-8/10  Treated for gout three times over 2020.  Mobic and predisone have decreased the pain.    Works as a principal at [employer].    Weight management plan: Patient was referred to their PCP to discuss a diet and exercise plan.     ROS:  10 point ROS neg other than the symptoms noted above in the HPI.    Patient Active Problem List   Diagnosis     Deviated nasal septum     CARDIOVASCULAR SCREENING; LDL GOAL LESS THAN 160     Hypertension goal BP (blood pressure) < 140/90     Iliotibial band syndrome - bilateral     Other hydrocele       PAST MEDICAL HISTORY:   Past Medical History:   Diagnosis Date     Hypertension      Unspecified glaucoma(365.9) 1998        PAST SURGICAL HISTORY:   Past Surgical History:   Procedure Laterality Date     HYDROCELECTOMY SCROTAL Left 8/8/2019    Procedure: hydrocelectomy;  Surgeon: Scott Silva MD;  Location: [facility]     REMOVAL OF SPERM DUCT(S)      Vasectomy        MEDICATIONS:   Current Outpatient Medications:      lisinopril (ZESTRIL) 10 MG tablet, TAKE ONE TABLET BY MOUTH ONCE DAILY, Disp: 90 tablet, Rfl: 0     meloxicam (MOBIC) 15 MG tablet, Take 1 tablet (15 mg) by mouth daily, Disp: 30 tablet, Rfl: 0     predniSONE (DELTASONE) 20 MG tablet, 2 pills a day for 5 days., Disp: 30 tablet, Rfl: 0     XALATAN SOLN 0.005 % OP, 1 DROP EACH EYE DAILY, Disp: , Rfl:      ALLERGIES:    Allergies   Allergen Reactions     No Known Drug Allergies         SOCIAL HISTORY:   Social History     Socioeconomic History     Marital status:      Spouse name: Not on file      "Number of children: Not on file     Years of education: Not on file     Highest education level: Not on file   Occupational History     Not on file   Social Needs     Financial resource strain: Not on file     Food insecurity     Worry: Not on file     Inability: Not on file     Transportation needs     Medical: Not on file     Non-medical: Not on file   Tobacco Use     Smoking status: Never Smoker     Smokeless tobacco: Former User   Substance and Sexual Activity     Alcohol use: Yes     Alcohol/week: 0.0 standard drinks     Comment: Social     Drug use: No     Sexual activity: Yes     Partners: Female     Birth control/protection: Surgical   Lifestyle     Physical activity     Days per week: Not on file     Minutes per session: Not on file     Stress: Not on file   Relationships     Social connections     Talks on phone: Not on file     Gets together: Not on file     Attends Mu-ism service: Not on file     Active member of club or organization: Not on file     Attends meetings of clubs or organizations: Not on file     Relationship status: Not on file     Intimate partner violence     Fear of current or ex partner: Not on file     Emotionally abused: Not on file     Physically abused: Not on file     Forced sexual activity: Not on file   Other Topics Concern     Parent/sibling w/ CABG, MI or angioplasty before 65F 55M? No   Social History Narrative     Not on file        FAMILY HISTORY:   Family History   Problem Relation Age of Onset     Depression Mother      C.A.D. Paternal Uncle         EXAM:Vitals: /84 (BP Location: Left arm, Patient Position: Sitting, Cuff Size: Adult Regular)   Ht 1.753 m (5' 9\")   Wt 95.3 kg (210 lb)   BMI 31.01 kg/m    BMI= Body mass index is 31.01 kg/m .    General appearance: Patient is alert and fully cooperative with history & exam.  No sign of distress is noted during the visit.     Psychiatric: Affect is pleasant & appropriate.  Patient appears motivated to improve " health.     Respiratory: Breathing is regular & unlabored while sitting.     HEENT: Hearing is intact to spoken word.  Speech is clear.  No gross evidence of visual impairment that would impact ambulation.     Vascular: DP & PT pulses are intact & regular bilaterally.  No significant edema or varicosities noted.  CFT and skin temperature is normal to both lower extremities.     Neurologic: Lower extremity sensation is intact to light touch.  No evidence of weakness or contracture in the lower extremities.  No evidence of neuropathy.    Dermatologic: Skin is intact to both lower extremities with adequate texture, turgor and tone about the integument.  No paronychia or evidence of soft tissue infection is noted.     Musculoskeletal: Patient is ambulatory without assistive device or brace.  There is gross prominence about the insertion of the Achilles tendon right greater than left at the superior lateral aspect of the insertion of the Achilles on the right calcaneus.  This prominence is noted on the left but only symptoms on the right.  Manual muscle strength is 5/5 to all 4 quadrants.  No edema is noted throughout the foot and ankle.  No pain through the watershed area of the right Achilles tendon.  Manual muscle strength equal bilateral.    Radiographs: 3 views right demonstrate osteophyte at the insertion of the Achilles tendon consistent with chronic changes.    ASSESSMENT:       ICD-10-CM    1. 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 Achilles tendinitis of right lower extremity  M76.61 Orthopedic & Spine  Referral     XR Foot Right G/E 3 Views     ORTHOTICS REFERRAL     Ankle/Foot Bracing Supplies Order for DME - ONLY FOR DME        PLAN:  Reviewed patient's chart in [facility].      1/13/2021   Obtained interpreted radiographs  Dispensed a night splint to improve conditioning and range of motion of the Achilles tendon  Recommended stretching at home  Recommended appropriate activities and shoe gear to avoid overuse of  the Achilles tendon  Continue oral anti-inflammatory Mobic  Discussed injection and surgical treatments and risk of rupture  Order for custom molded orthotics at his request with high arched cavus foot type.  Follow-up again in 1 month    Matthias Ng DPM

## 2021-01-13 NOTE — NURSING NOTE
Dispensed 1 Dorsal (Anterior) Night Splint, Size S/M, with [facility] agreement signed by patient. Keri Burks CMA, January 13, 2021

## 2021-04-08 DIAGNOSIS — I10 HYPERTENSION GOAL BP (BLOOD PRESSURE) < 140/90: ICD-10-CM

## 2021-04-09 RX ORDER — LISINOPRIL 10 MG/1
TABLET ORAL
Qty: 90 TABLET | Refills: 2 | Status: SHIPPED | OUTPATIENT
Start: 2021-04-09 | End: 2022-01-10

## 2021-04-09 NOTE — TELEPHONE ENCOUNTER
"  Requested Prescriptions   Pending Prescriptions Disp Refills     lisinopril (ZESTRIL) 10 MG tablet [Pharmacy Med Name: LISINOPRIL 10MG TABS] 90 tablet 0     Sig: TAKE ONE TABLET BY MOUTH ONCE DAILY   1/6/2021    ACE Inhibitors (Including Combos) Protocol Passed - 4/8/2021  5:29 AM        Passed - Blood pressure under 140/90 in past 12 months     BP Readings from Last 3 Encounters:   01/13/21 132/84   01/06/21 138/86   10/28/20 (!) 146/70           Passed - Recent (12 mo) or future (30 days) visit within the authorizing provider's specialty     Patient has had an office visit with the authorizing provider or a provider within the authorizing providers department within the previous 12 mos or has a future within next 30 days. See \"Patient Info\" tab in inbasket, or \"Choose Columns\" in Meds & Orders section of the refill encounter.            Passed - Medication is active on med list        Passed - Patient is age 18 or older        Passed - Normal serum creatinine on file in past 12 months     Recent Labs   Lab Test 10/28/20  0735   CR 0.92     Ok to refill medication if creatinine is low          Passed - Normal serum potassium on file in past 12 months     Recent Labs   Lab Test 10/28/20  0735   POTASSIUM 4.4            Prescription approved per [facility] Refill Protocol.  Dianne Pacheco RN    "

## 2021-04-23 ENCOUNTER — VIRTUAL VISIT (OUTPATIENT)
Dept: FAMILY MEDICINE | Facility: CLINIC | Age: 51
End: 2021-04-23
Payer: COMMERCIAL

## 2021-04-23 DIAGNOSIS — L30.9 DERMATITIS: Primary | ICD-10-CM

## 2021-04-23 PROCEDURE — 99213 OFFICE O/P EST LOW 20 MIN: CPT | Mod: GT | Performed by: PHYSICIAN ASSISTANT

## 2021-04-23 RX ORDER — TRIAMCINOLONE ACETONIDE 1 MG/G
CREAM TOPICAL
Qty: 453 G | Refills: 0 | Status: SHIPPED | OUTPATIENT
Start: 2021-04-23 | End: 2021-05-07

## 2021-04-23 RX ORDER — PREDNISONE 20 MG/1
TABLET ORAL
Qty: 20 TABLET | Refills: 0 | Status: SHIPPED | OUTPATIENT
Start: 2021-04-23 | End: 2021-09-09

## 2021-04-23 NOTE — PATIENT INSTRUCTIONS
Prednisone taper as directed.  Triamcinolone cream twice daily for up to 14 days  Eucerin cream to whole body daily, especially right after bathing.  Avoid lotions with a scent as these can be irritating.  Follow up with dermatologist if symptoms worsen or do not improve.

## 2021-04-23 NOTE — PROGRESS NOTES
Serafin is a 50 year old who is being evaluated via a billable video visit.      How would you like to obtain your AVS? MyChart  If the video visit is dropped, the invitation should be resent by: Text to cell phone: [phone]  Will anyone else be joining your video visit? No      Video Start Time: 2:42 PM    Assessment & Plan     Dermatitis  - predniSONE (DELTASONE) 20 MG tablet; Take 3 tabs by mouth daily x 3 days, then 2 tabs daily x 3 days, then 1 tab daily x 3 days, then 1/2 tab daily x 3 days.  - triamcinolone (KENALOG) 0.1 % external cream; Apply to affected area twice daily for up to 14 days.    Will treat with steroid cream and prednisone pills as this covers about 10% of Serafin's body and is now spreading. It will be difficult to cover all the areas with the cream. Follow up if symptoms worsen, return shortly after treatment or fail to improve.    20 minutes spent on the date of the encounter doing chart review, patient visit and documentation     No follow-ups on file.    Natalie Calixto PA-C  [facility]   Serafin is a 50 year old who presents for the following health issues     HPI     Rash  Onset/Duration: 1 spot for 3 months and now he has several spots on his body, itches a lot, the rashes comes and goes  Description  Location: stomach, both thighs, right arm  Character: linear, round, raised, flakey, red  Itching: severe  Intensity:  moderate  Progression of Symptoms:  intermittent  Accompanying signs and symptoms:   Fever: no  Body aches or joint pain: no  Sore throat symptoms: no  Recent cold symptoms: no  History:           Previous episodes of similar rash: None  New exposures:  None  Recent travel: no  Exposure to similar rash: no  Precipitating or alleviating factors: none  Therapies tried and outcome: calamine lotion, helps dry it up    Serafin presents via video then telephone for evaluation of an itchy rash that has been progressively worsening and spreading  over the last 3 months. It first began on his left lower abdomen. This patch is about the size of the palm of his hand and has gotten better and worse over time but has never fully resolved. He then developed a similar patch on his right abdomen and on his right medial thigh. These patches are erythematous, scaly, dry, thickened and itchy. Recenlty, he noticed several other smaller patches developing on his arms and legs. These seem to improve slightly with topical antihistamine creams and lotion but do not resolve.      Review of Systems   ROS negative except as stated above.        Objective           Vitals:  No vitals were obtained today due to virtual visit.    Physical Exam   GENERAL: Healthy, alert and no distress  EYES: Eyes grossly normal to inspection.  No discharge or erythema, or obvious scleral/conjunctival abnormalities.  RESP: No audible wheeze, cough, or visible cyanosis.  No visible retractions or increased work of breathing.    SKIN: 3 large erythematous patches noted, 1 each on bilateral lower abdomen and right medial thigh. These are dry and have flaky skin. These were not well visualized due to poor video quality.  NEURO: Cranial nerves grossly intact.  Mentation and speech appropriate for age.  PSYCH: Mentation appears normal, affect normal/bright, judgement and insight intact, normal speech and appearance well-groomed.        Video-Visit Details    Type of service:  Video Visit    Video End Time: 2:45 PM    Originating Location (pt. Location): Home    Distant Location (provider location):  [facility]    Platform used for Video Visit: [employer]     Video time: 3 min  Telephone time: 8 min

## 2021-05-07 ENCOUNTER — OFFICE VISIT (OUTPATIENT)
Dept: FAMILY MEDICINE | Facility: OTHER | Age: 51
End: 2021-05-07
Payer: COMMERCIAL

## 2021-05-07 VITALS
SYSTOLIC BLOOD PRESSURE: 128 MMHG | TEMPERATURE: 97.7 F | OXYGEN SATURATION: 98 % | WEIGHT: 202.5 LBS | DIASTOLIC BLOOD PRESSURE: 92 MMHG | BODY MASS INDEX: 28.99 KG/M2 | RESPIRATION RATE: 16 BRPM | HEART RATE: 88 BPM | HEIGHT: 70 IN

## 2021-05-07 DIAGNOSIS — Z12.11 SCREEN FOR COLON CANCER: ICD-10-CM

## 2021-05-07 DIAGNOSIS — Z82.61 FAMILY HISTORY OF ACUTE GOUTY ARTHRITIS: ICD-10-CM

## 2021-05-07 DIAGNOSIS — Z11.59 NEED FOR HEPATITIS C SCREENING TEST: ICD-10-CM

## 2021-05-07 DIAGNOSIS — M10.9 GOUTY ARTHRITIS OF LEFT GREAT TOE: Primary | ICD-10-CM

## 2021-05-07 LAB
ALBUMIN SERPL-MCNC: 4.4 G/DL (ref 3.4–5)
ALP SERPL-CCNC: 63 U/L (ref 40–150)
ALT SERPL W P-5'-P-CCNC: 48 U/L (ref 0–70)
ANION GAP SERPL CALCULATED.3IONS-SCNC: 2 MMOL/L (ref 3–14)
AST SERPL W P-5'-P-CCNC: 30 U/L (ref 0–45)
BILIRUB SERPL-MCNC: 1 MG/DL (ref 0.2–1.3)
BUN SERPL-MCNC: 14 MG/DL (ref 7–30)
CALCIUM SERPL-MCNC: 9 MG/DL (ref 8.5–10.1)
CHLORIDE SERPL-SCNC: 105 MMOL/L (ref 94–109)
CO2 SERPL-SCNC: 32 MMOL/L (ref 20–32)
CREAT SERPL-MCNC: 0.93 MG/DL (ref 0.66–1.25)
GFR SERPL CREATININE-BSD FRML MDRD: >90 ML/MIN/{1.73_M2}
GLUCOSE SERPL-MCNC: 115 MG/DL (ref 70–99)
HGB BLD-MCNC: 16.2 G/DL (ref 13.3–17.7)
POTASSIUM SERPL-SCNC: 4.2 MMOL/L (ref 3.4–5.3)
PROT SERPL-MCNC: 7.3 G/DL (ref 6.8–8.8)
SODIUM SERPL-SCNC: 139 MMOL/L (ref 133–144)
URATE SERPL-MCNC: 7.9 MG/DL (ref 3.5–7.2)

## 2021-05-07 PROCEDURE — 85018 HEMOGLOBIN: CPT | Performed by: PHYSICIAN ASSISTANT

## 2021-05-07 PROCEDURE — 99213 OFFICE O/P EST LOW 20 MIN: CPT | Performed by: PHYSICIAN ASSISTANT

## 2021-05-07 PROCEDURE — 80053 COMPREHEN METABOLIC PANEL: CPT | Performed by: PHYSICIAN ASSISTANT

## 2021-05-07 PROCEDURE — 84550 ASSAY OF BLOOD/URIC ACID: CPT | Performed by: PHYSICIAN ASSISTANT

## 2021-05-07 PROCEDURE — 36415 COLL VENOUS BLD VENIPUNCTURE: CPT | Performed by: PHYSICIAN ASSISTANT

## 2021-05-07 RX ORDER — ALLOPURINOL 100 MG/1
100 TABLET ORAL DAILY
Qty: 90 TABLET | Refills: 1 | Status: SHIPPED | OUTPATIENT
Start: 2021-05-07 | End: 2021-08-09

## 2021-05-07 RX ORDER — COLCHICINE 0.6 MG/1
TABLET ORAL
Qty: 30 TABLET | Refills: 3 | Status: SHIPPED | OUTPATIENT
Start: 2021-05-07 | End: 2022-04-05

## 2021-05-07 ASSESSMENT — MIFFLIN-ST. JEOR: SCORE: 1771.65

## 2021-05-07 NOTE — PROGRESS NOTES
Assessment & Plan     Screen for colon cancer  - KAYLA([employer])    Need for hepatitis C screening test  deferred    Gouty arthritis of left great toe  Family history of acute gouty arthritis  Fairly classic gout presentation to the left great toe.  He has had his gout flare in this region in the past.  He has been trying to be careful with what he eats and drinks but does admit that he has had a couple beers he thinks may have caused the current flareup.  Recheck related labs and treat as noted below follow-up as needed.  - Uric acid  - Comprehensive metabolic panel  - Hemoglobin  - colchicine (COLCYRS) 0.6 MG tablet; 1.2mg times one then 0.6mg an hour later, repeat in 3 days PRN  - allopurinol (ZYLOPRIM) 100 MG tablet; Take 1 tablet (100 mg) by mouth daily     Return in about 2 weeks (around 5/21/2021) for recheck of current condition, if symptoms do not improve.    [facility]     Stevie Vocsusan is a 50 year old male who presents to clinic today for the following health issues accompanied by his Left great toe pain    History of Present Illness       He eats 4 or more servings of fruits and vegetables daily.He consumes 1 sweetened beverage(s) daily.He exercises with enough effort to increase his heart rate 9 or less minutes per day.  He exercises with enough effort to increase his heart rate 3 or less days per week.   He is taking medications regularly.       Concern - Left great toe pain   Onset: today getting worse as the day goes on.  Description: Pain throbbing  Intensity: moderate 8/10  Progression of Symptoms:  worsening  Accompanying Signs & Symptoms: no  Previous history of similar problem: history of gout  Precipitating factors:        Worsened by: walking putting on shoe  Alleviating factors:        Improved by: no  Therapies tried and outcome: None      Review of Systems   Constitutional, HEENT, cardiovascular, pulmonary, GI, ,  "musculoskeletal, neuro, skin, endocrine and psych systems are negative, except as otherwise noted.      Objective    BP (!) 128/92   Pulse 88   Temp 97.7  F (36.5  C) (Temporal)   Resp 16   Ht 1.773 m (5' 9.8\")   Wt 91.9 kg (202 lb 8 oz)   SpO2 98%   BMI 29.22 kg/m    Body mass index is 29.22 kg/m .  Physical Exam   GENERAL: healthy, alert and no distress  NECK: no adenopathy, no asymmetry, masses, or scars and thyroid normal to palpation  RESP: lungs clear to auscultation - no rales, rhonchi or wheezes  CV: regular rate and rhythm, normal S1 S2, no S3 or S4, no murmur, click or rub, no peripheral edema and peripheral pulses strong  ABDOMEN: soft, nontender, no hepatosplenomegaly, no masses and bowel sounds normal  MS: no gross musculoskeletal defects noted, no edema, erythema to the left great toes noted medially.  Pain to the proximal left great toe joint.  Warmth is noted in this region as well no trauma was reported.    No results found for this or any previous visit (from the past 24 hour(s)).      "

## 2021-05-12 ENCOUNTER — TELEPHONE (OUTPATIENT)
Dept: FAMILY MEDICINE | Facility: OTHER | Age: 51
End: 2021-05-12

## 2021-05-12 DIAGNOSIS — M10.9 GOUTY ARTHRITIS OF LEFT GREAT TOE: Primary | ICD-10-CM

## 2021-05-12 RX ORDER — METHYLPREDNISOLONE 4 MG
TABLET, DOSE PACK ORAL
Qty: 21 TABLET | Refills: 0 | Status: SHIPPED | OUTPATIENT
Start: 2021-05-12 | End: 2021-09-09

## 2021-05-12 NOTE — TELEPHONE ENCOUNTER
Please contact the patient and let him know that there is a steroid burst and taper pack at his pharmacy for him.  Hopefully that will work.  Electronically signed:    Sunny Mckeon PA-C

## 2021-05-12 NOTE — TELEPHONE ENCOUNTER
Patient was in last week and was seen for gout. He was taking colchicine and feels that is not helping with the pain. Asking if there is something else he can do for this.     Ok to leave detailed message on cell if we do not reach patient as to instructions.

## 2021-06-06 NOTE — NURSING NOTE
ASSESSMENT:  1. Pain  gabapentin (NEURONTIN) 100 MG capsule   2. Lumbar stenosis with neurogenic claudication       PLAN:    Patient Instructions     Increase tylenol ? Acetaminophen to 1,000 mg three times a day scheduled to get ahead of the pain.     In addition to the tylenol, start gabapentin 100 mg (1 capsule) at bedtime for one-two weeks. If pain persists, then increase it to 200 mg (2 capsules) at bedtime. If you continue to have pain after 1-2 weeks, you can increase it to 300 mg (3 capsules) at bedtime. If on 300 mg (3 capsules)  You can reach out to Dr. Storm to change the prescription to 300 mg capsules so that you are only taking 1 capsule a day. Stay at dose that is effective for pain.     An addiontal option, is to add an addition 100 mg in the morning and watch for sedation.    Please keep your walker at your bedside increase you wake up in the middle of the night to use the bathroom while using the gabapentin.     You can return to the spine clinic if further investigation into the cause of the pain is desired.   MRIs if needed.    Cardiology on Wednesday: Double check that your cardiologist is okay with the gabapentin.        No orders of the defined types were placed in this encounter.    Medications Discontinued During This Encounter   Medication Reason     lidocaine (LIDODERM) 5 %      acetaminophen (TYLENOL) 325 MG tablet        Return in about 3 months (around 6/2/2020) for Annual physical.    CHIEF COMPLAINT:  Chief Complaint   Patient presents with     Arthritis     c/o worsening pain in the past 6 months, left hip and thigh pain, constant pain, has been using heat and Tylenol        HISTORY OF PRESENT ILLNESS:  Liz Gonzalez is a 89 y.o. female about left sided pain. She is accompanied by her daughter-in-law. She has had left lateral hip area that radiates down to her left knee which has been present for years. The pain is constant, but worse at night time. She grades the pain at a 8  Health Maintenance Due   Topic Date Due     HIV SCREEN (SYSTEM ASSIGNED)  10/25/1986     Caridad LEIJA LPN     out of 10. She has intermittent pain along her medial spine as well. This pain is associated with the left sided pain. Occasionally her left thigh will get numb. She will use a roller on her thigh which relieves her pain. Her pain is improved with walking. She had an x-ray of her back when she had her most recent prolia injection on 2019. She used to use lidocaine patches for her pain. Her daughter-in-law says that the patch did not give her long term relief. She is not currently using these patches. She had lumbar stenosis which was corrected with surgery on 2014 at [facility]. She has been seen at the spinal clinic several times prior to surgery. The other doctors that she has seen in the past for this current pain believe it may be due to the same issue. Today, the pain has become debilitating and she is interested in other medications to help her manage her pain. She is not interested in doing any imaging at this time. She is unable to take Advil due to her warfarin usage. She takes tylenol 650 mg 3x day. She has not tired gabapentin in the past.     REVIEW OF SYSTEMS:   Negative for: memory concerns, lightheadedness or shortness of breath. Comprehensive review of systems is negative except as noted in the HPI.     PFSH:  Tobacco use and medications reviewed below. She had lung cancer in . She used to be a nurse. She sees her endocrinologist and eye doctor regularly. She is in independent living. Her   this last year. She had an influenza vaccination in 2019.     Social History     Socioeconomic History     Marital status:      Spouse name: Not on file     Number of children: Not on file     Years of education: Not on file     Highest education level: Not on file   Occupational History     Not on file   Social Needs     Financial resource strain: Not on file     Food insecurity:     Worry: Not on file     Inability: Not on file     Transportation needs:      Medical: Not on file     Non-medical: Not on file   Tobacco Use     Smoking status: Former Smoker     Packs/day: 0.25     Years: 50.00     Pack years: 12.50     Last attempt to quit: 1998     Years since quittin.1     Smokeless tobacco: Never Used   Substance and Sexual Activity     Alcohol use: Yes     Alcohol/week: 7.0 standard drinks     Types: 7 Glasses of wine per week     Comment: 1 glass wine daily     Drug use: No     Sexual activity: Not Currently   Lifestyle     Physical activity:     Days per week: Not on file     Minutes per session: Not on file     Stress: Not on file   Relationships     Social connections:     Talks on phone: Not on file     Gets together: Not on file     Attends [employer] service: Not on file     Active member of club or organization: Not on file     Attends meetings of clubs or organizations: Not on file     Relationship status: Not on file     Intimate partner violence:     Fear of current or ex partner: Not on file     Emotionally abused: Not on file     Physically abused: Not on file     Forced sexual activity: Not on file   Other Topics Concern     Not on file   Social History Narrative    Lives by self.     Family History   Problem Relation Age of Onset     No Medical Problems Mother      No Medical Problems Father      No Medical Problems Sister      No Medical Problems Daughter      No Medical Problems Maternal Grandmother      No Medical Problems Maternal Grandfather      No Medical Problems Paternal Grandmother      No Medical Problems Paternal Grandfather      No Medical Problems Maternal Aunt      No Medical Problems Paternal Aunt      BRCA 1/2 Neg Hx      Breast cancer Neg Hx      Cancer Neg Hx      Colon cancer Neg Hx      Endometrial cancer Neg Hx      Ovarian cancer Neg Hx        TOBACCO USE:  Social History     Tobacco Use   Smoking Status Former Smoker     Packs/day: 0.25     Years: 50.00     Pack years: 12.50     Last attempt to quit: 1998     Years  since quittin.1   Smokeless Tobacco Never Used       VITALS:  Vitals:    20 1606   BP: 113/82   Patient Site: Left Arm   Patient Position: Sitting   Cuff Size: Adult Regular   Pulse: (!) 110   Resp: 16   Temp: (!) 96.4  F (35.8  C)   TempSrc: Oral   Weight: (!) 96 lb 3.2 oz (43.6 kg)     Wt Readings from Last 3 Encounters:   20 (!) 96 lb 3.2 oz (43.6 kg)   19 100 lb 11.2 oz (45.7 kg)   19 100 lb (45.4 kg)     PHYSICAL EXAM:  Constitutional:  Reveals an alert, pleasant female.  Vitals:  Per nursing notes.  Ears:  Clear.  Musculoskeletal: There is tenderness to palpation over the left SI joint. There is no tenderness to palpation of the left hip. She has good range of motion of the left hip without any pain. Negative straight leg raise bilaterally.   Skin:   Without rash, bruise, or palpable lesions.  Rheumatologic: Normal joints and nails of the hands.  Neurologic:  Cranial nerves II-XII intact.     Psychiatric:  Mood appropriate, memory intact.     DATA REVIEWED:  Additional History from Old Records Summarized (2): Reviewed hospital note from 2014 about spinal stenosis surgery.   Decision to Obtain Records (1): None.   Radiology Tests Summarized or Ordered (1): Reviewed XR Pelvis from 2019. Reviewed XR femur from 2019.   Labs Reviewed or Ordered (1): Reviewed labs from 2019.   Medicine Test Summarized or Ordered (1): None.   Independent Review of EKG, X-RAY, or RAPID STREP (2 each): None.     The visit lasted a total of 48 minutes face to face with the patient. Over 50% of the time was spent counseling and educating the patient about left lateral pain.     MEDICATIONS:  Current Outpatient Medications   Medication Sig Dispense Refill     acetaminophen (TYLENOL) 500 MG tablet Take 1,000 mg by mouth 3 (three) times a day.       ANTIOX#10/OM3/DHA/EPA/LUT/ZEAX (I-CAPS ORAL) Take 1 tablet by mouth 2 (two) times a day.       CALCIUM CARBONATE/VITAMIN D3 (CALTRATE 600 + D  ORAL) Take 1 tablet by mouth daily.       carvediloL (COREG) 25 MG tablet TAKE ONE TABLET BY MOUTH TWICE A DAY WITH MEALS 180 tablet 0     cholecalciferol, vitamin D3, (CHOLECALCIFEROL) 1,000 unit (25 mcg) tablet Take 1,000 Units by mouth at bedtime.        denosumab (PROLIA) 60 mg/mL Syrg Inject 60 mg under the skin every 6 (six) months. Outpatient Injection only.  Due around July 2014       diltiazem (CARDIZEM CD) 120 MG 24 hr capsule Take 2 capsules (240 mg total) by mouth daily. 180 capsule 3     FOLIC ACID/MV,FE,OTHER MIN (CENTRUM ORAL) Take 1 tablet by mouth daily.       furosemide (LASIX) 40 MG tablet TAKE ONE TABLET BY MOUTH TWICE A DAY AT 9 A.M. AND 6 P.M. 180 tablet 0     glucosamine-chondroitin 500-400 mg tablet Take 1 tablet by mouth 2 (two) times a day.       latanoprost (XALATAN) 0.005 % ophthalmic solution Administer 1 drop to both eyes bedtime.       lisinopril (PRINIVIL,ZESTRIL) 20 MG tablet Take 1 tablet (20 mg total) by mouth 2 (two) times a day. 180 tablet 3     nitroglycerin (NITROSTAT) 0.4 MG SL tablet Place 1 tablet (0.4 mg total) under the tongue every 5 (five) minutes as needed for chest pain (pt has never used). 25 tablet 11     timolol (BETIMOL) 0.5 % ophthalmic solution Administer 1 drop into the left eye daily.       timolol maleate (TIMOPTIC) 0.5 % ophthalmic solution USE 1 DROP(S) IN LEFT EYE ONCE A DAY. 90 DAY SUPPLY  3     warfarin (COUMADIN/JANTOVEN) 4 MG tablet Takes 1/2 tablet (2mg) to 1 tablet (4mg) by mouth daily, as directed.  Will adjust dose based on INR results. 90 tablet 1     gabapentin (NEURONTIN) 100 MG capsule 1 capsule at bedtime.  If pain persists in 1-2 weeks, increase to 2 capsules at bedtime.  If pain persists, then increase to 3 capsules at bedtime. 90 capsule 3     No current facility-administered medications for this visit.        CURRENT PROBLEM LIST:  Patient Active Problem List   Diagnosis     Malignant neoplasm of bronchus and lung (H)     Cataract      Osteoporosis     Sciatica     Chronic diastolic congestive heart failure (H)     Restless Legs Syndrome     Essential Hypertension     Permanent atrial fibrillation     Unspecified glaucoma     Osteoarthritis     Coronary Artery Disease     Lumbar stenosis with neurogenic claudication     Right shoulder pain, unspecified chronicity       I, Ronda Cardozo, am scribing for and in the presence of, Dr. Storm.    I, Dr. Storm, personally performed the services described in this documentation, as scribed by Ronda Cardozo in my presence, and it is both accurate and complete.    Total data points: 4

## 2021-08-07 DIAGNOSIS — Z82.61 FAMILY HISTORY OF ACUTE GOUTY ARTHRITIS: ICD-10-CM

## 2021-08-07 DIAGNOSIS — M10.9 GOUTY ARTHRITIS OF LEFT GREAT TOE: ICD-10-CM

## 2021-08-09 RX ORDER — ALLOPURINOL 100 MG/1
TABLET ORAL
Qty: 30 TABLET | Refills: 0 | Status: SHIPPED | OUTPATIENT
Start: 2021-08-09 | End: 2021-09-09

## 2021-08-09 NOTE — TELEPHONE ENCOUNTER
J patient. Short refill sent. Per last [facility] note in May, needs follow-up in 3 months so is due for follow-up.    Murtaza Hernandez PA-C

## 2021-08-09 NOTE — TELEPHONE ENCOUNTER
Pending Prescriptions:                       Disp   Refills    allopurinol (ZYLOPRIM) 100 MG tablet [Phar*90 tab*1        Sig: TAKE ONE TABLET BY MOUTH ONCE DAILY    Routing refill request to provider for review/approval because:  Labs out of range:  .uri

## 2021-08-10 NOTE — TELEPHONE ENCOUNTER
Left message for patient to call back regarding message below.  Souleymane Martin CMA ([facility])

## 2021-08-23 ENCOUNTER — NURSE TRIAGE (OUTPATIENT)
Dept: NURSING | Facility: CLINIC | Age: 51
End: 2021-08-23

## 2021-08-23 NOTE — TELEPHONE ENCOUNTER
Patient calls regarding a cough that has been present for 6-7 days. There is some phlegm at times, otherwise is a dry cough. Patient denies any congestion or sore throat. He reports that he is coughing constantly, does not get many breaks from coughing. The cough is keeping him up at night. Patient denies fever, he did have a negative covid-19 test on Friday.     Patient is advised on evaluation today or tomorrow, virtual visit would be acceptable. Patient does not have a preference and is transferred to scheduling at this time. Will use urgent care if unable to schedule an appointment. Patient denies further questions or concerns.    Gaby Dominguez RN  [facility]        Reason for Disposition    Continuous (nonstop) coughing interferes with work or school and no improvement using cough treatment per Care Advice    Additional Information    Negative: Bluish (or gray) lips or face    Negative: Severe difficulty breathing (e.g., struggling for each breath, speaks in single words)    Negative: Rapid onset of cough and has hives    Negative: Coughing started suddenly after medicine, an allergic food or bee sting    Negative: Difficulty breathing after exposure to flames, smoke, or fumes    Negative: Sounds like a life-threatening emergency to the triager    Negative: Previous asthma attacks and this feels like asthma attack    Negative: Chest pain present when not coughing    Negative: Difficulty breathing    Negative: Passed out (i.e., fainted, collapsed and was not responding)    Negative: Patient sounds very sick or weak to the triager    Negative: Coughed up > 1 tablespoon (15 ml) blood (Exception: blood-tinged sputum)    Negative: Fever > 103 F (39.4 C)    Negative: Fever > 101 F (38.3 C) and over 60 years of age    Negative: Fever > 100.0 F (37.8 C) and has diabetes mellitus or a weak immune system (e.g., HIV positive, cancer chemotherapy, organ transplant, splenectomy, chronic steroids)     Negative: Fever > 100.0 F (37.8 C) and bedridden (e.g., nursing home patient, stroke, chronic illness, recovering from surgery)    Negative: Increasing ankle swelling    Negative: Wheezing is present    Negative: SEVERE coughing spells (e.g., whooping sound after coughing, vomiting after coughing)    Negative: Coughing up huy-colored (reddish-brown) or blood-tinged sputum    Negative: Fever present > 3 days (72 hours)    Negative: Fever returns after gone for over 24 hours and symptoms worse or not improved    Negative: Using nasal washes and pain medicine > 24 hours and sinus pain persists    Negative: Known COPD or other severe lung disease (i.e., bronchiectasis, cystic fibrosis, lung surgery) and worsening symptoms (i.e., increased sputum purulence or amount, increased breathing difficulty)    Protocols used: COUGH-A-OH  COVID 19 Nurse Triage Plan/Patient Instructions    Please be aware that novel coronavirus (COVID-19) may be circulating in the community. If you develop symptoms such as fever, cough, or SOB or if you have concerns about the presence of another infection including coronavirus (COVID-19), please contact your health care provider or visit [email or web address].     Disposition/Instructions    Virtual Visit with provider recommended. Reference Visit Selection Guide.    Thank you for taking steps to prevent the spread of this virus.  o Limit your contact with others.  o Wear a simple mask to cover your cough.  o Wash your hands well and often.    Resources    [facility]: About COVID-19: [email or web address]    CDC: What to Do If You're Sick: www.cdc.gov/coronavirus/2019-ncov/about/steps-when-sick.html    CDC: Ending Home Isolation: www.cdc.gov/coronavirus/2019-ncov/hcp/disposition-in-home-patients.html     CDC: Caring for Someone: www.cdc.gov/coronavirus/2019-ncov/if-you-are-sick/care-for-someone.html     MD: Interim Guidance for Hospital Discharge to Home:  [email or web address]/hcp/hospdischarge.pdf    [facility] clinical trials (COVID-19 research studies): [email or web address]     Below are the COVID-19 hotlines at the [facility] ([facility]). Interpreters are available.   o For health questions: Call 083-897-8762 or 1-663.954.7910 (7 a.m. to 7 p.m.)  o For questions about schools and childcare: Call 989-278-3716 or [phone] (7 a.m. to 7 p.m.)

## 2021-08-26 ENCOUNTER — MYC MEDICAL ADVICE (OUTPATIENT)
Dept: FAMILY MEDICINE | Facility: OTHER | Age: 51
End: 2021-08-26

## 2021-08-26 NOTE — TELEPHONE ENCOUNTER
Looks like provider responded this after noon.  Would you want him to give it a few more days or use same day tomorrow?

## 2021-08-26 NOTE — TELEPHONE ENCOUNTER
I think that he should give the medication a chance to work for 3 or 4 more days and we can consider trying to work him in next week at some point in time if it is not resolving.

## 2021-09-09 ENCOUNTER — OFFICE VISIT (OUTPATIENT)
Dept: FAMILY MEDICINE | Facility: OTHER | Age: 51
End: 2021-09-09
Payer: COMMERCIAL

## 2021-09-09 ENCOUNTER — ANCILLARY PROCEDURE (OUTPATIENT)
Dept: GENERAL RADIOLOGY | Facility: OTHER | Age: 51
End: 2021-09-09
Attending: PHYSICIAN ASSISTANT
Payer: COMMERCIAL

## 2021-09-09 VITALS
RESPIRATION RATE: 16 BRPM | OXYGEN SATURATION: 97 % | WEIGHT: 202 LBS | SYSTOLIC BLOOD PRESSURE: 140 MMHG | HEIGHT: 70 IN | HEART RATE: 72 BPM | BODY MASS INDEX: 28.92 KG/M2 | TEMPERATURE: 98 F | DIASTOLIC BLOOD PRESSURE: 74 MMHG

## 2021-09-09 DIAGNOSIS — Z12.11 SCREEN FOR COLON CANCER: ICD-10-CM

## 2021-09-09 DIAGNOSIS — Z11.59 NEED FOR HEPATITIS C SCREENING TEST: ICD-10-CM

## 2021-09-09 DIAGNOSIS — R05.3 CHRONIC COUGHING: ICD-10-CM

## 2021-09-09 DIAGNOSIS — I10 HYPERTENSION GOAL BP (BLOOD PRESSURE) < 140/90: ICD-10-CM

## 2021-09-09 DIAGNOSIS — M10.9 GOUTY ARTHRITIS OF LEFT GREAT TOE: ICD-10-CM

## 2021-09-09 DIAGNOSIS — Z82.61 FAMILY HISTORY OF ACUTE GOUTY ARTHRITIS: ICD-10-CM

## 2021-09-09 DIAGNOSIS — R05.3 CHRONIC COUGHING: Primary | ICD-10-CM

## 2021-09-09 LAB
ALBUMIN SERPL-MCNC: 4.4 G/DL (ref 3.4–5)
ALP SERPL-CCNC: 67 U/L (ref 40–150)
ALT SERPL W P-5'-P-CCNC: 38 U/L (ref 0–70)
ANION GAP SERPL CALCULATED.3IONS-SCNC: 2 MMOL/L (ref 3–14)
AST SERPL W P-5'-P-CCNC: 44 U/L (ref 0–45)
BASOPHILS # BLD AUTO: 0 10E3/UL (ref 0–0.2)
BASOPHILS NFR BLD AUTO: 0 %
BILIRUB SERPL-MCNC: 0.6 MG/DL (ref 0.2–1.3)
BUN SERPL-MCNC: 9 MG/DL (ref 7–30)
CALCIUM SERPL-MCNC: 9.3 MG/DL (ref 8.5–10.1)
CHLORIDE BLD-SCNC: 107 MMOL/L (ref 94–109)
CO2 SERPL-SCNC: 30 MMOL/L (ref 20–32)
CREAT SERPL-MCNC: 0.97 MG/DL (ref 0.66–1.25)
EOSINOPHIL # BLD AUTO: 0.1 10E3/UL (ref 0–0.7)
EOSINOPHIL NFR BLD AUTO: 2 %
ERYTHROCYTE [DISTWIDTH] IN BLOOD BY AUTOMATED COUNT: 12.9 % (ref 10–15)
GFR SERPL CREATININE-BSD FRML MDRD: 89 ML/MIN/1.73M2
GLUCOSE BLD-MCNC: 118 MG/DL (ref 70–99)
HCT VFR BLD AUTO: 44.9 % (ref 40–53)
HGB BLD-MCNC: 15.8 G/DL (ref 13.3–17.7)
LYMPHOCYTES # BLD AUTO: 1.5 10E3/UL (ref 0.8–5.3)
LYMPHOCYTES NFR BLD AUTO: 25 %
MCH RBC QN AUTO: 32.3 PG (ref 26.5–33)
MCHC RBC AUTO-ENTMCNC: 35.2 G/DL (ref 31.5–36.5)
MCV RBC AUTO: 92 FL (ref 78–100)
MONOCYTES # BLD AUTO: 0.5 10E3/UL (ref 0–1.3)
MONOCYTES NFR BLD AUTO: 9 %
NEUTROPHILS # BLD AUTO: 3.9 10E3/UL (ref 1.6–8.3)
NEUTROPHILS NFR BLD AUTO: 64 %
PLATELET # BLD AUTO: 163 10E3/UL (ref 150–450)
POTASSIUM BLD-SCNC: 4.8 MMOL/L (ref 3.4–5.3)
PROT SERPL-MCNC: 7.6 G/DL (ref 6.8–8.8)
RBC # BLD AUTO: 4.89 10E6/UL (ref 4.4–5.9)
SODIUM SERPL-SCNC: 139 MMOL/L (ref 133–144)
URATE SERPL-MCNC: 8.7 MG/DL (ref 3.5–7.2)
WBC # BLD AUTO: 6 10E3/UL (ref 4–11)

## 2021-09-09 PROCEDURE — 99214 OFFICE O/P EST MOD 30 MIN: CPT | Performed by: PHYSICIAN ASSISTANT

## 2021-09-09 PROCEDURE — 71046 X-RAY EXAM CHEST 2 VIEWS: CPT | Performed by: RADIOLOGY

## 2021-09-09 PROCEDURE — 80053 COMPREHEN METABOLIC PANEL: CPT | Performed by: PHYSICIAN ASSISTANT

## 2021-09-09 PROCEDURE — 84550 ASSAY OF BLOOD/URIC ACID: CPT | Performed by: PHYSICIAN ASSISTANT

## 2021-09-09 PROCEDURE — 85025 COMPLETE CBC W/AUTO DIFF WBC: CPT | Performed by: PHYSICIAN ASSISTANT

## 2021-09-09 PROCEDURE — 36415 COLL VENOUS BLD VENIPUNCTURE: CPT | Performed by: PHYSICIAN ASSISTANT

## 2021-09-09 RX ORDER — ALLOPURINOL 100 MG/1
100 TABLET ORAL DAILY
Qty: 90 TABLET | Refills: 1 | Status: SHIPPED | OUTPATIENT
Start: 2021-09-09 | End: 2022-04-05

## 2021-09-09 ASSESSMENT — MIFFLIN-ST. JEOR: SCORE: 1772.52

## 2021-09-09 ASSESSMENT — PAIN SCALES - GENERAL: PAINLEVEL: NO PAIN (0)

## 2021-09-09 NOTE — PROGRESS NOTES
"    Assessment & Plan     Chronic coughing  Based on chest x-ray I do wonder about possible pulmonary fibrosis.  We will have CT scan to evaluate this further.  Labs that have returned at time of dictation are within normal limits.  - CBC with platelets and differential; Future  - Comprehensive metabolic panel (BMP + Alb, Alk Phos, ALT, AST, Total. Bili, TP); Future  - XR Chest 2 Views; Future  - CBC with platelets and differential  - Comprehensive metabolic panel (BMP + Alb, Alk Phos, ALT, AST, Total. Bili, TP)  - CT Chest w/o Contrast; Future    Hypertension goal BP (blood pressure) < 140/90  Marginal control this at this point time but awaiting further labs prior to making changes with respect to medication.  I doubt that his lisinopril is causing him to have this chronic cough since he has had lisinopril for several years and the cough started about 4 weeks ago.  - CBC with platelets and differential; Future  - Comprehensive metabolic panel (BMP + Alb, Alk Phos, ALT, AST, Total. Bili, TP); Future  - XR Chest 2 Views; Future  - CBC with platelets and differential  - Comprehensive metabolic panel (BMP + Alb, Alk Phos, ALT, AST, Total. Bili, TP)  - CT Chest w/o Contrast; Future    Gouty arthritis of left great toe  Rechecking related labs.  - Uric acid; Future  - Uric acid    Screen for colon cancer  Had a normal fecal sample Cologuard test done in May of this year  Need for hepatitis C screening test     BMI:   Estimated body mass index is 28.98 kg/m  as calculated from the following:    Height as of this encounter: 1.778 m (5' 10\").    Weight as of this encounter: 91.6 kg (202 lb).   Weight management plan: Discussed healthy diet and exercise guidelines    Work on weight loss  Regular exercise  No follow-ups on file.    Sunny Mckeon PA-C  [facility] SALEEM Betancourt is a 50 year old who presents for the following health issues     HPI     Hypertension Follow-up also has had a cough " "for over 1 month no other symptoms      Do you check your blood pressure regularly outside of the clinic? No     Are you following a low salt diet? No    Are your blood pressures ever more than 140 on the top number (systolic) OR more   than 90 on the bottom number (diastolic), for example 140/90? unknown      Review of Systems   Constitutional, HEENT, cardiovascular, pulmonary, GI, , musculoskeletal, neuro, skin, endocrine and psych systems are negative, except as otherwise noted.      Objective    BP (!) 140/74   Pulse 72   Temp 98  F (36.7  C) (Temporal)   Resp 16   Ht 1.778 m (5' 10\")   Wt 91.6 kg (202 lb)   SpO2 97%   BMI 28.98 kg/m    Body mass index is 28.98 kg/m .  Physical Exam   GENERAL: healthy, alert and no distress  NECK: no adenopathy, no asymmetry, masses, or scars and thyroid normal to palpation  RESP: expiratory wheezes throughout, decreased breath sounds throughout and scattered crackles that did not clear with a dry nonproductive cough.  CV: regular rate and rhythm, normal S1 S2, no S3 or S4, no murmur, click or rub, no peripheral edema and peripheral pulses strong  ABDOMEN: soft, nontender, no hepatosplenomegaly, no masses and bowel sounds normal  MS: no gross musculoskeletal defects noted, no edema    CXR Findings: Questionable congestion more on the right than on the left with what appears to be some potential small nodules more on the right than on the left.  I do think that there may be a summation artifact and to the origin of the third rib on the right.  It will be read by radiology.  CT scan pending.    Results for orders placed or performed in visit on 09/09/21 (from the past 24 hour(s))   CBC with platelets and differential    Narrative    The following orders were created for panel order CBC with platelets and differential.  Procedure                               Abnormality         Status                     ---------                               -----------         ------   "                   CBC with platelets and d...[[id number]]                      Final result                 Please view results for these tests on the individual orders.   CBC with platelets and differential   Result Value Ref Range    WBC Count 6.0 4.0 - 11.0 10e3/uL    RBC Count 4.89 4.40 - 5.90 10e6/uL    Hemoglobin 15.8 13.3 - 17.7 g/dL    Hematocrit 44.9 40.0 - 53.0 %    MCV 92 78 - 100 fL    MCH 32.3 26.5 - 33.0 pg    MCHC 35.2 31.5 - 36.5 g/dL    RDW 12.9 10.0 - 15.0 %    Platelet Count 163 150 - 450 10e3/uL    % Neutrophils 64 %    % Lymphocytes 25 %    % Monocytes 9 %    % Eosinophils 2 %    % Basophils 0 %    Absolute Neutrophils 3.9 1.6 - 8.3 10e3/uL    Absolute Lymphocytes 1.5 0.8 - 5.3 10e3/uL    Absolute Monocytes 0.5 0.0 - 1.3 10e3/uL    Absolute Eosinophils 0.1 0.0 - 0.7 10e3/uL    Absolute Basophils 0.0 0.0 - 0.2 10e3/uL

## 2021-09-10 ENCOUNTER — HOSPITAL ENCOUNTER (OUTPATIENT)
Dept: CT IMAGING | Facility: CLINIC | Age: 51
Discharge: HOME | End: 2021-09-10
Attending: PHYSICIAN ASSISTANT | Admitting: PHYSICIAN ASSISTANT
Payer: COMMERCIAL

## 2021-09-10 DIAGNOSIS — I10 HYPERTENSION GOAL BP (BLOOD PRESSURE) < 140/90: ICD-10-CM

## 2021-09-10 DIAGNOSIS — R05.3 CHRONIC COUGHING: ICD-10-CM

## 2021-09-10 PROCEDURE — 250N000009 HC RX 250: Performed by: PHYSICIAN ASSISTANT

## 2021-09-10 PROCEDURE — 250N000011 HC RX IP 250 OP 636: Performed by: PHYSICIAN ASSISTANT

## 2021-09-10 PROCEDURE — 71260 CT THORAX DX C+: CPT

## 2021-09-10 RX ORDER — IOPAMIDOL 755 MG/ML
500 INJECTION, SOLUTION INTRAVASCULAR ONCE
Status: COMPLETED | OUTPATIENT
Start: 2021-09-10 | End: 2021-09-10

## 2021-09-10 RX ADMIN — IOPAMIDOL 80 ML: 755 INJECTION, SOLUTION INTRAVENOUS at 08:14

## 2021-09-10 RX ADMIN — SODIUM CHLORIDE 60 ML: 9 INJECTION, SOLUTION INTRAVENOUS at 08:14

## 2021-10-23 ENCOUNTER — HEALTH MAINTENANCE LETTER (OUTPATIENT)
Age: 51
End: 2021-10-23

## 2021-12-18 ENCOUNTER — HEALTH MAINTENANCE LETTER (OUTPATIENT)
Age: 51
End: 2021-12-18

## 2022-01-08 DIAGNOSIS — I10 HYPERTENSION GOAL BP (BLOOD PRESSURE) < 140/90: ICD-10-CM

## 2022-01-10 RX ORDER — LISINOPRIL 10 MG/1
TABLET ORAL
Qty: 90 TABLET | Refills: 0 | Status: SHIPPED | OUTPATIENT
Start: 2022-01-10 | End: 2022-04-05

## 2022-01-10 NOTE — TELEPHONE ENCOUNTER
Lisinopril  Routing refill request to provider for review/approval because:  BP failed RN refill protocol    Dianne Pacheco RN

## 2022-03-07 ENCOUNTER — MYC MEDICAL ADVICE (OUTPATIENT)
Dept: FAMILY MEDICINE | Facility: OTHER | Age: 52
End: 2022-03-07
Payer: COMMERCIAL

## 2022-03-07 NOTE — TELEPHONE ENCOUNTER
Please see [employer] message. Ok for virtual or do you want in person?    Jolie Carbone, MUKESHN, RN, PHN  Registered Nurse-Clinic Triage  [facility]/[place]  3/7/2022 at 9:32 AM

## 2022-03-07 NOTE — TELEPHONE ENCOUNTER
"Per provider: \"He is due for a full physical please have him schedule a physical and we can address the medications and labs that are needed.   This of course should be face-to-face.   Electronically signed:     Sunny Mckeon PA-C  \"    Responded to [place].    Jolie Carbone, MUKESHN, RN, PHN  Registered Nurse-Clinic Triage  [facility]/[place]  3/7/2022 at 10:19 AM    "

## 2022-04-05 ENCOUNTER — OFFICE VISIT (OUTPATIENT)
Dept: FAMILY MEDICINE | Facility: OTHER | Age: 52
End: 2022-04-05
Payer: COMMERCIAL

## 2022-04-05 VITALS
DIASTOLIC BLOOD PRESSURE: 94 MMHG | HEIGHT: 69 IN | WEIGHT: 200 LBS | RESPIRATION RATE: 24 BRPM | SYSTOLIC BLOOD PRESSURE: 146 MMHG | TEMPERATURE: 97.2 F | BODY MASS INDEX: 29.62 KG/M2 | OXYGEN SATURATION: 100 % | HEART RATE: 65 BPM

## 2022-04-05 DIAGNOSIS — I10 HYPERTENSION GOAL BP (BLOOD PRESSURE) < 140/90: ICD-10-CM

## 2022-04-05 DIAGNOSIS — Z00.00 ROUTINE HISTORY AND PHYSICAL EXAMINATION OF ADULT: Primary | ICD-10-CM

## 2022-04-05 DIAGNOSIS — M10.9 GOUTY ARTHRITIS OF LEFT GREAT TOE: ICD-10-CM

## 2022-04-05 DIAGNOSIS — E78.5 HYPERLIPIDEMIA LDL GOAL <130: ICD-10-CM

## 2022-04-05 DIAGNOSIS — Z12.5 SCREENING FOR PROSTATE CANCER: ICD-10-CM

## 2022-04-05 DIAGNOSIS — Z82.61 FAMILY HISTORY OF ACUTE GOUTY ARTHRITIS: ICD-10-CM

## 2022-04-05 DIAGNOSIS — L72.3 SEBACEOUS CYST: ICD-10-CM

## 2022-04-05 DIAGNOSIS — Z12.11 SCREEN FOR COLON CANCER: ICD-10-CM

## 2022-04-05 DIAGNOSIS — Z12.11 SPECIAL SCREENING FOR MALIGNANT NEOPLASMS, COLON: ICD-10-CM

## 2022-04-05 LAB
ALBUMIN SERPL-MCNC: 4.2 G/DL (ref 3.4–5)
ALP SERPL-CCNC: 52 U/L (ref 40–150)
ALT SERPL W P-5'-P-CCNC: 37 U/L (ref 0–70)
ANION GAP SERPL CALCULATED.3IONS-SCNC: 1 MMOL/L (ref 3–14)
AST SERPL W P-5'-P-CCNC: 26 U/L (ref 0–45)
BILIRUB SERPL-MCNC: 0.5 MG/DL (ref 0.2–1.3)
BUN SERPL-MCNC: 12 MG/DL (ref 7–30)
CALCIUM SERPL-MCNC: 8.6 MG/DL (ref 8.5–10.1)
CHLORIDE BLD-SCNC: 108 MMOL/L (ref 94–109)
CHOLEST SERPL-MCNC: 176 MG/DL
CO2 SERPL-SCNC: 31 MMOL/L (ref 20–32)
CREAT SERPL-MCNC: 0.85 MG/DL (ref 0.66–1.25)
ERYTHROCYTE [DISTWIDTH] IN BLOOD BY AUTOMATED COUNT: 12.3 % (ref 10–15)
FASTING STATUS PATIENT QL REPORTED: YES
GFR SERPL CREATININE-BSD FRML MDRD: >90 ML/MIN/1.73M2
GLUCOSE BLD-MCNC: 108 MG/DL (ref 70–99)
HCT VFR BLD AUTO: 43.8 % (ref 40–53)
HDLC SERPL-MCNC: 48 MG/DL
HGB BLD-MCNC: 15.5 G/DL (ref 13.3–17.7)
LDLC SERPL CALC-MCNC: 64 MG/DL
MCH RBC QN AUTO: 32 PG (ref 26.5–33)
MCHC RBC AUTO-ENTMCNC: 35.4 G/DL (ref 31.5–36.5)
MCV RBC AUTO: 90 FL (ref 78–100)
NONHDLC SERPL-MCNC: 128 MG/DL
PLATELET # BLD AUTO: 151 10E3/UL (ref 150–450)
POTASSIUM BLD-SCNC: 4.6 MMOL/L (ref 3.4–5.3)
PROT SERPL-MCNC: 7.1 G/DL (ref 6.8–8.8)
PSA SERPL-MCNC: 1.27 UG/L (ref 0–4)
RBC # BLD AUTO: 4.85 10E6/UL (ref 4.4–5.9)
SODIUM SERPL-SCNC: 140 MMOL/L (ref 133–144)
TRIGL SERPL-MCNC: 318 MG/DL
URATE SERPL-MCNC: 8.6 MG/DL (ref 3.5–7.2)
WBC # BLD AUTO: 3.2 10E3/UL (ref 4–11)

## 2022-04-05 PROCEDURE — 99396 PREV VISIT EST AGE 40-64: CPT | Performed by: PHYSICIAN ASSISTANT

## 2022-04-05 PROCEDURE — 80061 LIPID PANEL: CPT | Performed by: PHYSICIAN ASSISTANT

## 2022-04-05 PROCEDURE — 84550 ASSAY OF BLOOD/URIC ACID: CPT | Performed by: PHYSICIAN ASSISTANT

## 2022-04-05 PROCEDURE — G0103 PSA SCREENING: HCPCS | Performed by: PHYSICIAN ASSISTANT

## 2022-04-05 PROCEDURE — 36415 COLL VENOUS BLD VENIPUNCTURE: CPT | Performed by: PHYSICIAN ASSISTANT

## 2022-04-05 PROCEDURE — 85027 COMPLETE CBC AUTOMATED: CPT | Performed by: PHYSICIAN ASSISTANT

## 2022-04-05 PROCEDURE — 80053 COMPREHEN METABOLIC PANEL: CPT | Performed by: PHYSICIAN ASSISTANT

## 2022-04-05 PROCEDURE — 99214 OFFICE O/P EST MOD 30 MIN: CPT | Mod: 25 | Performed by: PHYSICIAN ASSISTANT

## 2022-04-05 RX ORDER — LISINOPRIL AND HYDROCHLOROTHIAZIDE 20; 25 MG/1; MG/1
1 TABLET ORAL DAILY
Qty: 90 TABLET | Refills: 3 | Status: SHIPPED | OUTPATIENT
Start: 2022-04-05 | End: 2023-03-27

## 2022-04-05 RX ORDER — ALLOPURINOL 100 MG/1
100 TABLET ORAL DAILY
Qty: 90 TABLET | Refills: 3 | Status: SHIPPED | OUTPATIENT
Start: 2022-04-05 | End: 2023-03-27

## 2022-04-05 RX ORDER — COLCHICINE 0.6 MG/1
TABLET ORAL
Qty: 30 TABLET | Refills: 3 | Status: SHIPPED | OUTPATIENT
Start: 2022-04-05 | End: 2024-04-26

## 2022-04-05 RX ORDER — LISINOPRIL 10 MG/1
10 TABLET ORAL DAILY
Qty: 90 TABLET | Refills: 3 | Status: CANCELLED | OUTPATIENT
Start: 2022-04-05

## 2022-04-05 NOTE — PROGRESS NOTES
{PROVIDER CHARTING PREFERENCE:671972}    Starr Betancourt is a 51 year old who presents for the following health issues {ACCOMPANIED BY STATEMENT (Optional):552233}    History of Present Illness       Reason for visit:  Wellness check    He eats 2-3 servings of fruits and vegetables daily.He consumes 0 sweetened beverage(s) daily.He exercises with enough effort to increase his heart rate 9 or less minutes per day.  He exercises with enough effort to increase his heart rate 3 or less days per week.   He is taking medications regularly.       {SUPERLIST (Optional):774587}  {additonal problems for provider to add (Optional):287559}    Review of Systems   {ROS COMP (Optional):111629}      Objective    There were no vitals taken for this visit.  There is no height or weight on file to calculate BMI.  Physical Exam   {Exam List (Optional):001486}    {Diagnostic Test Results (Optional):068625}    {AMBULATORY ATTESTATION (Optional):686181}

## 2022-04-05 NOTE — PROGRESS NOTES
SUBJECTIVE:   CC: Serafin Hurd is an 51 year old male who presents for preventative health visit.       Patient has been advised of split billing requirements and indicates understanding: Yes  Healthy Habits:     Getting at least 3 servings of Calcium per day:  Yes    Bi-annual eye exam:  Yes    Dental care twice a year:  Yes    Sleep apnea or symptoms of sleep apnea:  None    Diet:  Regular (no restrictions)    Frequency of exercise:  None    Duration of exercise:  N/A    Taking medications regularly:  Yes    Barriers to taking medications:  None    Medication side effects:  None    PHQ-2 Total Score: 0    Additional concerns today:  Yes (Cyst on right shoulder)  History of Present Illness       Reason for visit:  Wellness check    He eats 2-3 servings of fruits and vegetables daily.He consumes 0 sweetened beverage(s) daily.He exercises with enough effort to increase his heart rate 9 or less minutes per day.  He exercises with enough effort to increase his heart rate 3 or less days per week.   He is not taking prescribed medications regularly due to None.    Today's PHQ-2 Score:   PHQ-2 ( 1999 [employer]) 4/5/2022   Q1: Little interest or pleasure in doing things 0   Q2: Feeling down, depressed or hopeless 0   PHQ-2 Score 0   PHQ-2 Total Score (12-17 Years)- Positive if 3 or more points; Administer PHQ-A if positive -   Q1: Little interest or pleasure in doing things Not at all   Q2: Feeling down, depressed or hopeless Not at all   PHQ-2 Score 0       Abuse: Current or Past(Physical, Sexual or Emotional)- No  Do you feel safe in your environment? Yes    Have you ever done Advance Care Planning? (For example, a Health Directive, POLST, or a discussion with a medical provider or your loved ones about your wishes): No, advance care planning information given to patient to review.  Patient declined advance care planning discussion at this time.    Social History     Tobacco Use     Smoking status: Never Smoker      Smokeless tobacco: Former User   Substance Use Topics     Alcohol use: Yes     Alcohol/week: 0.0 standard drinks     Comment: Social     If you drink alcohol do you typically have >3 drinks per day or >7 drinks per week? No    Alcohol Use 10/28/2020   Prescreen: >3 drinks/day or >7 drinks/week? -   Prescreen: >3 drinks/day or >7 drinks/week? No   AUDIT SCORE  -       Last PSA:   PSA   Date Value Ref Range Status   10/28/2020 1.19 0 - 4 ug/L Final     Comment:     Assay Method:  Chemiluminescence using Siemens Vista analyzer       Reviewed orders with patient. Reviewed health maintenance and updated orders accordingly - Yes  Lab work is in process  Labs reviewed in EPIC  BP Readings from Last 3 Encounters:   04/05/22 (!) 150/100   09/09/21 (!) 140/74   05/07/21 (!) 128/92    Wt Readings from Last 3 Encounters:   04/05/22 90.7 kg (200 lb)   09/09/21 91.6 kg (202 lb)   05/07/21 91.9 kg (202 lb 8 oz)                  Patient Active Problem List   Diagnosis     Deviated nasal septum     CARDIOVASCULAR SCREENING; LDL GOAL LESS THAN 160     Hypertension goal BP (blood pressure) < 140/90     Iliotibial band syndrome - bilateral     Other hydrocele     Gouty arthritis of left great toe     Family history of acute gouty arthritis     Hyperlipidemia LDL goal <130     Past Surgical History:   Procedure Laterality Date     HYDROCELECTOMY SCROTAL Left 8/8/2019    Procedure: hydrocelectomy;  Surgeon: Scott Silva MD;  Location: [facility]     REMOVAL OF SPERM DUCT(S)      Vasectomy       Social History     Tobacco Use     Smoking status: Never Smoker     Smokeless tobacco: Former User   Substance Use Topics     Alcohol use: Yes     Alcohol/week: 0.0 standard drinks     Comment: Social     Family History   Problem Relation Age of Onset     Depression Mother      C.A.D. Paternal Uncle          Current Outpatient Medications   Medication Sig Dispense Refill     allopurinol (ZYLOPRIM) 100 MG tablet Take 1 tablet (100 mg) by mouth  "daily 90 tablet 3     colchicine (COLCYRS) 0.6 MG tablet 1.2mg times one then 0.6mg an hour later, repeat in 3 days PRN 30 tablet 3     lisinopril (ZESTRIL) 10 MG tablet TAKE ONE TABLET BY MOUTH ONCE DAILY 90 tablet 0     lisinopril-hydrochlorothiazide (ZESTORETIC) 20-25 MG tablet Take 1 tablet by mouth daily 90 tablet 3     XALATAN SOLN 0.005 % OP 1 DROP EACH EYE DAILY       Allergies   Allergen Reactions     No Known Drug Allergies        Reviewed and updated as needed this visit by clinical staff                  Reviewed and updated as needed this visit by Provider                 Past Medical History:   Diagnosis Date     Hypertension      Unspecified glaucoma(365.9) 1998      Past Surgical History:   Procedure Laterality Date     HYDROCELECTOMY SCROTAL Left 8/8/2019    Procedure: hydrocelectomy;  Surgeon: Scott Silva MD;  Location: [facility]     REMOVAL OF SPERM DUCT(S)      Vasectomy       Review of Systems  CONSTITUTIONAL: NEGATIVE for fever, chills, change in weight  INTEGUMENTARY/SKIN: NEGATIVE for worrisome rashes, moles or lesions  EYES: NEGATIVE for vision changes or irritation  ENT: NEGATIVE for ear, mouth and throat problems  RESP: NEGATIVE for significant cough or SOB  CV: NEGATIVE for chest pain, palpitations or peripheral edema  GI: NEGATIVE for nausea, abdominal pain, heartburn, or change in bowel habits   male: negative for dysuria, hematuria, decreased urinary stream, erectile dysfunction, urethral discharge  MUSCULOSKELETAL: NEGATIVE for significant arthralgias or myalgia  NEURO: NEGATIVE for weakness, dizziness or paresthesias  PSYCHIATRIC: NEGATIVE for changes in mood or affect    OBJECTIVE:   BP (!) 146/94   Pulse 65   Temp 97.2  F (36.2  C) (Temporal)   Resp 24   Ht 1.753 m (5' 9\")   Wt 90.7 kg (200 lb)   SpO2 100%   BMI 29.53 kg/m      Physical Exam  GENERAL: healthy, alert and no distress  EYES: Eyes grossly normal to inspection, PERRL and conjunctivae and sclerae " normal  HENT: ear canals and TM's normal, nose and mouth without ulcers or lesions  NECK: no adenopathy, no asymmetry, masses, or scars and thyroid normal to palpation  RESP: lungs clear to auscultation - no rales, rhonchi or wheezes  CV: regular rate and rhythm, normal S1 S2, no S3 or S4, no murmur, click or rub, no peripheral edema and peripheral pulses strong  ABDOMEN: soft, nontender, no hepatosplenomegaly, no masses and bowel sounds normal  MS: no gross musculoskeletal defects noted, no edema  SKIN: no suspicious lesions or rashes with exception of what appears to be a sebaceous cyst by his report to the right upper shoulder posteriorly.  Is approximately 3 cm in rough diameter.  He has been able to express some sebum out of this region in the past.  Recommended surgical intervention for removal.  NEURO: Normal strength and tone, mentation intact and speech normal  PSYCH: mentation appears normal, affect normal/bright    Diagnostic Test Results:  Labs reviewed in Epic  No results found for any visits on 04/05/22.    ASSESSMENT/PLAN:   Serafin was seen today for physical.    Diagnoses and all orders for this visit:    Routine history and physical examination of adult  -     Fecal colorectal cancer screen (FIT); Future  -     CBC with platelets; Future  -     Comprehensive metabolic panel (BMP + Alb, Alk Phos, ALT, AST, Total. Bili, TP); Future  -     Lipid panel reflex to direct LDL Fasting; Future  -     PSA, screen; Future    Hypertension goal BP (blood pressure) < 140/90  -     lisinopril-hydrochlorothiazide (ZESTORETIC) 20-25 MG tablet; Take 1 tablet by mouth daily    Hyperlipidemia LDL goal <130  -     Comprehensive metabolic panel (BMP + Alb, Alk Phos, ALT, AST, Total. Bili, TP); Future  -     Lipid panel reflex to direct LDL Fasting; Future    Gouty arthritis of left great toe  -     Uric acid; Future  -     allopurinol (ZYLOPRIM) 100 MG tablet; Take 1 tablet (100 mg) by mouth daily  -     colchicine  "(COLCYRS) 0.6 MG tablet; 1.2mg times one then 0.6mg an hour later, repeat in 3 days PRN    Family history of acute gouty arthritis  -     allopurinol (ZYLOPRIM) 100 MG tablet; Take 1 tablet (100 mg) by mouth daily  -     colchicine (COLCYRS) 0.6 MG tablet; 1.2mg times one then 0.6mg an hour later, repeat in 3 days PRN    Sebaceous cyst - right posterior shoulder  -     Adult General Surg Referral; Future    Screen for colon cancer  -     Fecal colorectal cancer screen (FIT); Future    Special screening for malignant neoplasms, colon  -     Fecal colorectal cancer screen (FIT); Future    Screening for prostate cancer  -     PSA, screen; Future    51-year-old male presents the clinic today for full physical.  No new concerns from the standpoint today other than those listed within the medical record.  Follow-up in 1 year.    Patient blood pressure is not in good control at this point time and we adjust medications.  He should follow up in 3 to 4 weeks for blood pressure check.  We can adjust medications from there once again.    Patient has a history of gout and we are rechecking uric acid levels as well as refilling his medications today no concerns or complaints at this point time.    Reviewed his LDL goal and rechecking related labs.    He is due for routine screening and arrangements are made for labs as well as testing.  Follow-up based on results.      Patient has been advised of split billing requirements and indicates understanding: Yes    COUNSELING:   Reviewed preventive health counseling, as reflected in patient instructions       Regular exercise       Healthy diet/nutrition       Vision screening       Hearing screening       Aspirin prophylaxis        Alcohol Use     Estimated body mass index is 28.98 kg/m  as calculated from the following:    Height as of 9/9/21: 1.778 m (5' 10\").    Weight as of 9/9/21: 91.6 kg (202 lb).     Weight management plan: Discussed healthy diet and exercise guidelines    He " reports that he has never smoked. He has quit using smokeless tobacco.      Counseling Resources:  ATP IV Guidelines  Pooled Cohorts Equation Calculator  FRAX Risk Assessment  ICSI Preventive Guidelines  Dietary Guidelines for Americans, 2010  USDA's MyPlate  ASA Prophylaxis  Lung CA Screening    [facility]

## 2022-04-11 ENCOUNTER — OFFICE VISIT (OUTPATIENT)
Dept: SURGERY | Facility: CLINIC | Age: 52
End: 2022-04-11
Attending: PHYSICIAN ASSISTANT
Payer: COMMERCIAL

## 2022-04-11 ENCOUNTER — LAB (OUTPATIENT)
Dept: LAB | Facility: OTHER | Age: 52
End: 2022-04-11
Payer: COMMERCIAL

## 2022-04-11 VITALS
WEIGHT: 200 LBS | SYSTOLIC BLOOD PRESSURE: 120 MMHG | RESPIRATION RATE: 18 BRPM | TEMPERATURE: 96.9 F | DIASTOLIC BLOOD PRESSURE: 80 MMHG | HEIGHT: 69 IN | BODY MASS INDEX: 29.62 KG/M2

## 2022-04-11 DIAGNOSIS — Z00.00 ROUTINE HISTORY AND PHYSICAL EXAMINATION OF ADULT: ICD-10-CM

## 2022-04-11 DIAGNOSIS — Z12.11 SPECIAL SCREENING FOR MALIGNANT NEOPLASMS, COLON: ICD-10-CM

## 2022-04-11 DIAGNOSIS — Z12.11 SCREEN FOR COLON CANCER: ICD-10-CM

## 2022-04-11 DIAGNOSIS — L72.3 SEBACEOUS CYST: ICD-10-CM

## 2022-04-11 LAB — HEMOCCULT STL QL IA: NEGATIVE

## 2022-04-11 PROCEDURE — 82274 ASSAY TEST FOR BLOOD FECAL: CPT

## 2022-04-11 PROCEDURE — 99203 OFFICE O/P NEW LOW 30 MIN: CPT | Performed by: SPECIALIST

## 2022-04-11 ASSESSMENT — PAIN SCALES - GENERAL: PAINLEVEL: NO PAIN (0)

## 2022-04-11 NOTE — LETTER
4/11/2022         RE: Serafin Hurd  [address] [address]        Dear Colleague,    Thank you for referring your patient, Serafin Hurd, to the [facility]. Please see a copy of my visit note below.    Consult requested by Sunny Milan    Reason for consultation: Right shoulder sebaceous cyst    HPI:  Patient is a 51-year-old white male with a longstanding history of a sebaceous cyst on his right shoulder.  He was found on a routine physical exam and could be seen to assured at that time.  Since the office visit he was able to squeeze a significant mount of see been out.  There is still a small residual cyst in place.  He also has had an I&D done in that area many years ago.  Denies any pain fevers or chills.    Past Medical History:   Diagnosis Date     Hypertension      Unspecified glaucoma(365.9) 1998     Past Surgical History:   Procedure Laterality Date     HYDROCELECTOMY SCROTAL Left 8/8/2019    Procedure: hydrocelectomy;  Surgeon: Scott Silva MD;  Location: [facility]     REMOVAL OF SPERM DUCT(S)      Vasectomy     Current Outpatient Medications   Medication     allopurinol (ZYLOPRIM) 100 MG tablet     colchicine (COLCYRS) 0.6 MG tablet     lisinopril-hydrochlorothiazide (ZESTORETIC) 20-25 MG tablet     XALATAN SOLN 0.005 % OP     No current facility-administered medications for this visit.        Allergies   Allergen Reactions     No Known Drug Allergies      Social History     Social History Narrative     Not on file     Family History   Problem Relation Age of Onset     Depression Mother      C.A.D. Paternal Uncle       ROS: 10 point ROS neg other than the symptoms noted above in the HPI.      PE:  B/P: 120/80, T: 96.9, P: Data Unavailable, R: 18  General: well developed, well nourished WM who appears their stated age  HEENT: NC/AT, EOMI, (-)icterus, (-)injection  Neck: Supple, No JVD  Chest: CTA  Heart: S1, S2, (-)m/r/g  Abd: Soft, non tender, non distended,  non tender, no masses  Ext; Warm, no edema.  Right shoulder: 1.5 x 1.5 x 1 cm subcutaneous mass with punctum.  No signs of infection.  Reported smaller since drainage.  Psych: AAOx3  Neuro: No focal deficits    Impression/plan:  This is a 51-year-old gentleman with a sebaceous cyst that was partially drained by himself.  Does not appear to be infected at this time.  I discussed the options of observation versus excision.  The patient wishes excision under local anesthesia in the clinic.  This will be scheduled for when the procedure room is available.   The procedure, risks, benefits, and alternatives were discussed and the patient agrees to proceed.      Trent Thayer MD, FACS      Again, thank you for allowing me to participate in the care of your patient.        Sincerely,        Trent Thayer MD

## 2022-04-11 NOTE — PROGRESS NOTES
Consult requested by Sunny Milan    Reason for consultation: Right shoulder sebaceous cyst    HPI:  Patient is a 51-year-old white male with a longstanding history of a sebaceous cyst on his right shoulder.  He was found on a routine physical exam and could be seen to assured at that time.  Since the office visit he was able to squeeze a significant mount of see been out.  There is still a small residual cyst in place.  He also has had an I&D done in that area many years ago.  Denies any pain fevers or chills.    Past Medical History:   Diagnosis Date     Hypertension      Unspecified glaucoma(365.9) 1998     Past Surgical History:   Procedure Laterality Date     HYDROCELECTOMY SCROTAL Left 8/8/2019    Procedure: hydrocelectomy;  Surgeon: Scott Silva MD;  Location: [facility]     REMOVAL OF SPERM DUCT(S)      Vasectomy     Current Outpatient Medications   Medication     allopurinol (ZYLOPRIM) 100 MG tablet     colchicine (COLCYRS) 0.6 MG tablet     lisinopril-hydrochlorothiazide (ZESTORETIC) 20-25 MG tablet     XALATAN SOLN 0.005 % OP     No current facility-administered medications for this visit.        Allergies   Allergen Reactions     No Known Drug Allergies      Social History     Social History Narrative     Not on file     Family History   Problem Relation Age of Onset     Depression Mother      C.A.D. Paternal Uncle       ROS: 10 point ROS neg other than the symptoms noted above in the HPI.      PE:  B/P: 120/80, T: 96.9, P: Data Unavailable, R: 18  General: well developed, well nourished WM who appears their stated age  HEENT: NC/AT, EOMI, (-)icterus, (-)injection  Neck: Supple, No JVD  Chest: CTA  Heart: S1, S2, (-)m/r/g  Abd: Soft, non tender, non distended, non tender, no masses  Ext; Warm, no edema.  Right shoulder: 1.5 x 1.5 x 1 cm subcutaneous mass with punctum.  No signs of infection.  Reported smaller since drainage.  Psych: AAOx3  Neuro: No focal deficits    Impression/plan:  This is a  51-year-old gentleman with a sebaceous cyst that was partially drained by himself.  Does not appear to be infected at this time.  I discussed the options of observation versus excision.  The patient wishes excision under local anesthesia in the clinic.  This will be scheduled for when the procedure room is available.   The procedure, risks, benefits, and alternatives were discussed and the patient agrees to proceed.      Trent Thayer MD, FACS

## 2022-04-14 ENCOUNTER — OFFICE VISIT (OUTPATIENT)
Dept: SURGERY | Facility: CLINIC | Age: 52
End: 2022-04-14
Payer: COMMERCIAL

## 2022-04-14 VITALS
DIASTOLIC BLOOD PRESSURE: 88 MMHG | BODY MASS INDEX: 29.53 KG/M2 | SYSTOLIC BLOOD PRESSURE: 138 MMHG | OXYGEN SATURATION: 100 % | HEART RATE: 82 BPM | TEMPERATURE: 96.6 F | WEIGHT: 200 LBS

## 2022-04-14 DIAGNOSIS — L72.3 SEBACEOUS CYST: Primary | ICD-10-CM

## 2022-04-14 DIAGNOSIS — L72.3 SEBACEOUS CYST: ICD-10-CM

## 2022-04-14 PROCEDURE — 88305 TISSUE EXAM BY PATHOLOGIST: CPT | Performed by: SPECIALIST

## 2022-04-14 PROCEDURE — 11401 EXC TR-EXT B9+MARG 0.6-1 CM: CPT | Performed by: SPECIALIST

## 2022-04-14 ASSESSMENT — PAIN SCALES - GENERAL: PAINLEVEL: NO PAIN (0)

## 2022-04-14 NOTE — OP NOTE
Procedure Date: 2022    PREOPERATIVE DIAGNOSIS:  Subcutaneous mass, right shoulder, probable sebaceous cyst.    POSTOPERATIVE DIAGNOSIS:  Subcutaneous mass, right shoulder, probable sebaceous cyst.    PROCEDURE PERFORMED:  Excision of a 1 x 0.5 x 0.5 cm subcutaneous mass, probable sebaceous cyst.    SURGEON:  Trent Thayer MD, FACS    ANESTHESIA:  Local.    INDICATIONS FOR PROCEDURE:  This is a 51-year-old gentleman who had a previous sebaceous cyst excised in that area.  He had an I and D done at that site and a new lesion appeared.  He was able to squeeze some of it out, but there was a residual, and he wanted the remainder excised.    OPERATIVE FINDINGS:  Included a 1 x 0.5 x 0.5 cm subcutaneous scarred in mass consistent with a probable sebaceous cyst.    DESCRIPTION OF PROCEDURE:  With the cooperation of the patient preoperatively in the area, the right shoulder was marked.  He was then prepped and draped in sterile fashion.  We then infiltrated with a local anesthetic.  After adequate analgesia was achieved, an incision was made over the mass.  It was then found to be quite scarred in.  It was dissected free using tenotomy scissors and submitted as specimen with the measurements as stated above.  Hemostasis was achieved using cautery.  Subcutaneous tissue was reapproximated using 3-0 Vicryl.  The skin was closed using Exofin glue.  The patient was then sent home from the procedure room in stable condition with instructions to follow up in one week.    Trent Thayer MD, FACS        D: 2022   T: 2022   MT: [facility]    Name:     MATIAS JAMISON  MRN:      [id number]        Account:        [id number]   :      1970           Procedure Date: 2022     Document: [id number]

## 2022-04-19 LAB
PATH REPORT.COMMENTS IMP SPEC: NORMAL
PATH REPORT.FINAL DX SPEC: NORMAL
PATH REPORT.GROSS SPEC: NORMAL
PATH REPORT.MICROSCOPIC SPEC OTHER STN: NORMAL
PATH REPORT.RELEVANT HX SPEC: NORMAL

## 2022-04-21 ENCOUNTER — VIRTUAL VISIT (OUTPATIENT)
Dept: SURGERY | Facility: CLINIC | Age: 52
End: 2022-04-21
Payer: COMMERCIAL

## 2022-04-21 DIAGNOSIS — Z98.890 POST-OPERATIVE STATE: Primary | ICD-10-CM

## 2022-04-21 PROCEDURE — 99024 POSTOP FOLLOW-UP VISIT: CPT | Performed by: SPECIALIST

## 2022-04-21 NOTE — PROGRESS NOTES
Serafin is a 51 year old who is being evaluated via a billable telephone visit.      What phone number would you like to be contacted at?   How would you like to obtain your AVS? MyChart    Follow-up for excision of cyst    Subjective   Serafin is a 51 year old who presents for the following health issues: Had cyst excised from his right shoulder region last week.  Feels everything is healing well no complaints.        Objective:  Patient reports incision is healing well no concerns    Path:  Final Diagnosis   A. Right shoulder mass:  - Subcutaneous fibrosis and lymphohistiocytic infiltrate with foreign body giant cells - (see comment)   Electronically signed by Dao Hernandez MD on 4/19/2022 at 12:56 PM   Comment  [id number]   These features are not entirely diagnostic, but given the clinical history, are compatible with a ruptured folliculitis or cyst.  There is no evidence of malignancy.           Assessment/plan:  This is a 51-year-old gentleman status post excision of a ruptured follicular cyst.  Doing well.  No concerns.  He will keep the area clean and avoid sunlight over the next 6 weeks.  He will follow with me as necessary.    Trent Thayer MD, FACS    Phone call duration: 5 minutes

## 2022-10-09 ENCOUNTER — HEALTH MAINTENANCE LETTER (OUTPATIENT)
Age: 52
End: 2022-10-09

## 2023-03-23 DIAGNOSIS — M10.9 GOUTY ARTHRITIS OF LEFT GREAT TOE: ICD-10-CM

## 2023-03-23 DIAGNOSIS — Z82.61 FAMILY HISTORY OF ACUTE GOUTY ARTHRITIS: ICD-10-CM

## 2023-03-23 DIAGNOSIS — I10 HYPERTENSION GOAL BP (BLOOD PRESSURE) < 140/90: ICD-10-CM

## 2023-03-23 RX ORDER — ALLOPURINOL 100 MG/1
100 TABLET ORAL DAILY
Qty: 90 TABLET | Refills: 3 | OUTPATIENT
Start: 2023-03-23

## 2023-03-23 RX ORDER — LISINOPRIL AND HYDROCHLOROTHIAZIDE 20; 25 MG/1; MG/1
1 TABLET ORAL DAILY
Qty: 90 TABLET | Refills: 3 | OUTPATIENT
Start: 2023-03-23

## 2023-03-24 DIAGNOSIS — Z82.61 FAMILY HISTORY OF ACUTE GOUTY ARTHRITIS: Primary | ICD-10-CM

## 2023-03-24 DIAGNOSIS — M10.9 GOUTY ARTHRITIS OF LEFT GREAT TOE: ICD-10-CM

## 2023-03-24 DIAGNOSIS — I10 HYPERTENSION GOAL BP (BLOOD PRESSURE) < 140/90: ICD-10-CM

## 2023-03-25 ENCOUNTER — LAB (OUTPATIENT)
Dept: LAB | Facility: CLINIC | Age: 53
End: 2023-03-25
Payer: COMMERCIAL

## 2023-03-25 DIAGNOSIS — Z82.61 FAMILY HISTORY OF ACUTE GOUTY ARTHRITIS: ICD-10-CM

## 2023-03-25 DIAGNOSIS — M10.9 GOUTY ARTHRITIS OF LEFT GREAT TOE: ICD-10-CM

## 2023-03-25 DIAGNOSIS — I10 HYPERTENSION GOAL BP (BLOOD PRESSURE) < 140/90: ICD-10-CM

## 2023-03-25 LAB
ALBUMIN SERPL BCG-MCNC: 4.7 G/DL (ref 3.5–5.2)
ALP SERPL-CCNC: 61 U/L (ref 40–129)
ALT SERPL W P-5'-P-CCNC: 43 U/L (ref 10–50)
ANION GAP SERPL CALCULATED.3IONS-SCNC: 8 MMOL/L (ref 7–15)
AST SERPL W P-5'-P-CCNC: 38 U/L (ref 10–50)
BILIRUB SERPL-MCNC: 1 MG/DL
BUN SERPL-MCNC: 13.8 MG/DL (ref 6–20)
CALCIUM SERPL-MCNC: 9.3 MG/DL (ref 8.6–10)
CHLORIDE SERPL-SCNC: 100 MMOL/L (ref 98–107)
CREAT SERPL-MCNC: 0.99 MG/DL (ref 0.67–1.17)
DEPRECATED HCO3 PLAS-SCNC: 28 MMOL/L (ref 22–29)
GFR SERPL CREATININE-BSD FRML MDRD: >90 ML/MIN/1.73M2
GLUCOSE SERPL-MCNC: 115 MG/DL (ref 70–99)
POTASSIUM SERPL-SCNC: 5.1 MMOL/L (ref 3.4–5.3)
PROT SERPL-MCNC: 7.3 G/DL (ref 6.4–8.3)
SODIUM SERPL-SCNC: 136 MMOL/L (ref 136–145)
URATE SERPL-MCNC: 7.4 MG/DL (ref 3.4–7)

## 2023-03-25 PROCEDURE — 36415 COLL VENOUS BLD VENIPUNCTURE: CPT

## 2023-03-25 PROCEDURE — 84550 ASSAY OF BLOOD/URIC ACID: CPT

## 2023-03-25 PROCEDURE — 80053 COMPREHEN METABOLIC PANEL: CPT

## 2023-03-27 RX ORDER — ALLOPURINOL 100 MG/1
100 TABLET ORAL DAILY
Qty: 90 TABLET | Refills: 3 | Status: SHIPPED | OUTPATIENT
Start: 2023-03-27 | End: 2024-03-25

## 2023-03-27 RX ORDER — LISINOPRIL AND HYDROCHLOROTHIAZIDE 20; 25 MG/1; MG/1
1 TABLET ORAL DAILY
Qty: 90 TABLET | Refills: 3 | Status: SHIPPED | OUTPATIENT
Start: 2023-03-27 | End: 2024-03-25

## 2023-03-27 NOTE — TELEPHONE ENCOUNTER
Do encourage him to establish care with primary care provider of his choosing.  He does not have one listed.  Refill sent to the pharmacy for him.  He has not had a blood pressure check in almost a year.  Electronically signed:    Sunny Mckeon PA-C

## 2023-04-25 ENCOUNTER — OFFICE VISIT (OUTPATIENT)
Dept: FAMILY MEDICINE | Facility: OTHER | Age: 53
End: 2023-04-25
Payer: COMMERCIAL

## 2023-04-25 VITALS
SYSTOLIC BLOOD PRESSURE: 116 MMHG | DIASTOLIC BLOOD PRESSURE: 80 MMHG | RESPIRATION RATE: 18 BRPM | HEART RATE: 64 BPM | HEIGHT: 69 IN | TEMPERATURE: 97.4 F | WEIGHT: 206 LBS | BODY MASS INDEX: 30.51 KG/M2 | OXYGEN SATURATION: 99 %

## 2023-04-25 DIAGNOSIS — I10 HYPERTENSION GOAL BP (BLOOD PRESSURE) < 140/90: ICD-10-CM

## 2023-04-25 DIAGNOSIS — Z00.00 ROUTINE HISTORY AND PHYSICAL EXAMINATION OF ADULT: Primary | ICD-10-CM

## 2023-04-25 DIAGNOSIS — Z82.61 FAMILY HISTORY OF ACUTE GOUTY ARTHRITIS: ICD-10-CM

## 2023-04-25 DIAGNOSIS — Z23 NEED FOR COVID-19 VACCINE: ICD-10-CM

## 2023-04-25 DIAGNOSIS — M10.9 GOUTY ARTHRITIS OF LEFT GREAT TOE: ICD-10-CM

## 2023-04-25 DIAGNOSIS — H40.003 GLAUCOMA SUSPECT, BILATERAL: ICD-10-CM

## 2023-04-25 DIAGNOSIS — E78.5 HYPERLIPIDEMIA LDL GOAL <130: ICD-10-CM

## 2023-04-25 DIAGNOSIS — Z12.5 SCREENING FOR PROSTATE CANCER: ICD-10-CM

## 2023-04-25 DIAGNOSIS — Z12.11 SCREEN FOR COLON CANCER: ICD-10-CM

## 2023-04-25 LAB
ALBUMIN SERPL BCG-MCNC: 4.5 G/DL (ref 3.5–5.2)
ALP SERPL-CCNC: 62 U/L (ref 40–129)
ALT SERPL W P-5'-P-CCNC: 53 U/L (ref 10–50)
ANION GAP SERPL CALCULATED.3IONS-SCNC: 12 MMOL/L (ref 7–15)
AST SERPL W P-5'-P-CCNC: 38 U/L (ref 10–50)
BILIRUB SERPL-MCNC: 0.7 MG/DL
BUN SERPL-MCNC: 14.4 MG/DL (ref 6–20)
CALCIUM SERPL-MCNC: 9.1 MG/DL (ref 8.6–10)
CHLORIDE SERPL-SCNC: 100 MMOL/L (ref 98–107)
CHOLEST SERPL-MCNC: 184 MG/DL
CREAT SERPL-MCNC: 1.06 MG/DL (ref 0.67–1.17)
DEPRECATED HCO3 PLAS-SCNC: 28 MMOL/L (ref 22–29)
ERYTHROCYTE [DISTWIDTH] IN BLOOD BY AUTOMATED COUNT: 12.3 % (ref 10–15)
GFR SERPL CREATININE-BSD FRML MDRD: 83 ML/MIN/1.73M2
GLUCOSE SERPL-MCNC: 111 MG/DL (ref 70–99)
HCT VFR BLD AUTO: 41.9 % (ref 40–53)
HDLC SERPL-MCNC: 38 MG/DL
HGB BLD-MCNC: 15 G/DL (ref 13.3–17.7)
LDLC SERPL CALC-MCNC: 100 MG/DL
MCH RBC QN AUTO: 32.6 PG (ref 26.5–33)
MCHC RBC AUTO-ENTMCNC: 35.8 G/DL (ref 31.5–36.5)
MCV RBC AUTO: 91 FL (ref 78–100)
NONHDLC SERPL-MCNC: 146 MG/DL
PLATELET # BLD AUTO: 146 10E3/UL (ref 150–450)
POTASSIUM SERPL-SCNC: 4.4 MMOL/L (ref 3.4–5.3)
PROT SERPL-MCNC: 6.8 G/DL (ref 6.4–8.3)
PSA SERPL DL<=0.01 NG/ML-MCNC: 1.16 NG/ML (ref 0–3.5)
RBC # BLD AUTO: 4.6 10E6/UL (ref 4.4–5.9)
SODIUM SERPL-SCNC: 140 MMOL/L (ref 136–145)
TRIGL SERPL-MCNC: 232 MG/DL
URATE SERPL-MCNC: 6.6 MG/DL (ref 3.4–7)
WBC # BLD AUTO: 3.9 10E3/UL (ref 4–11)

## 2023-04-25 PROCEDURE — 99214 OFFICE O/P EST MOD 30 MIN: CPT | Mod: 25 | Performed by: PHYSICIAN ASSISTANT

## 2023-04-25 PROCEDURE — 80061 LIPID PANEL: CPT | Performed by: PHYSICIAN ASSISTANT

## 2023-04-25 PROCEDURE — G0103 PSA SCREENING: HCPCS | Performed by: PHYSICIAN ASSISTANT

## 2023-04-25 PROCEDURE — 80053 COMPREHEN METABOLIC PANEL: CPT | Performed by: PHYSICIAN ASSISTANT

## 2023-04-25 PROCEDURE — 0134A COVID-19 VACCINE BIVALENT BOOSTER 18+ (MODERNA): CPT | Performed by: PHYSICIAN ASSISTANT

## 2023-04-25 PROCEDURE — 85027 COMPLETE CBC AUTOMATED: CPT | Performed by: PHYSICIAN ASSISTANT

## 2023-04-25 PROCEDURE — 99396 PREV VISIT EST AGE 40-64: CPT | Mod: 25 | Performed by: PHYSICIAN ASSISTANT

## 2023-04-25 PROCEDURE — 91313 COVID-19 VACCINE BIVALENT BOOSTER 18+ (MODERNA): CPT | Performed by: PHYSICIAN ASSISTANT

## 2023-04-25 PROCEDURE — 36415 COLL VENOUS BLD VENIPUNCTURE: CPT | Performed by: PHYSICIAN ASSISTANT

## 2023-04-25 PROCEDURE — 84550 ASSAY OF BLOOD/URIC ACID: CPT | Performed by: PHYSICIAN ASSISTANT

## 2023-04-25 ASSESSMENT — ENCOUNTER SYMPTOMS
HEMATOCHEZIA: 0
HEMATURIA: 0
HEARTBURN: 0
EYE PAIN: 0
NERVOUS/ANXIOUS: 0
FEVER: 0
MYALGIAS: 0
CHILLS: 0
CONSTIPATION: 0
SORE THROAT: 0
ABDOMINAL PAIN: 0
HEADACHES: 0
SHORTNESS OF BREATH: 0
DIZZINESS: 0
NAUSEA: 0
DYSURIA: 0
COUGH: 0
PALPITATIONS: 0
PARESTHESIAS: 0
FREQUENCY: 0
WEAKNESS: 0
DIARRHEA: 0
JOINT SWELLING: 0
ARTHRALGIAS: 0

## 2023-04-25 ASSESSMENT — PAIN SCALES - GENERAL: PAINLEVEL: NO PAIN (0)

## 2023-04-25 NOTE — PROGRESS NOTES
SUBJECTIVE:   CC: Serafin is an 52 year old who presents for preventative health visit.       4/25/2023     6:50 AM   Additional Questions   Roomed by Diane   Accompanied by Self   Patient has been advised of split billing requirements and indicates understanding: Yes     Healthy Habits:     Getting at least 3 servings of Calcium per day:  Yes    Bi-annual eye exam:  Yes    Dental care twice a year:  Yes    Sleep apnea or symptoms of sleep apnea:  None    Diet:  Regular (no restrictions)    Frequency of exercise:  None    Taking medications regularly:  Yes    Medication side effects:  None    PHQ-2 Total Score: 0    Additional concerns today:  No    Today's PHQ-2 Score:       4/25/2023     6:45 AM   PHQ-2 ( 1999 [employer])   Q1: Little interest or pleasure in doing things 0   Q2: Feeling down, depressed or hopeless 0   PHQ-2 Score 0   Q1: Little interest or pleasure in doing things Not at all    Not at all   Q2: Feeling down, depressed or hopeless Not at all    Not at all   PHQ-2 Score 0    0     Social History     Tobacco Use     Smoking status: Never     Smokeless tobacco: Former   Vaping Use     Vaping status: Never Used   Substance Use Topics     Alcohol use: Yes     Alcohol/week: 0.0 standard drinks of alcohol     Comment: Social             4/25/2023     6:45 AM   Alcohol Use   Prescreen: >3 drinks/day or >7 drinks/week? No       Last PSA:   PSA   Date Value Ref Range Status   10/28/2020 1.19 0 - 4 ug/L Final     Comment:     Assay Method:  Chemiluminescence using Siemens Vista analyzer     Prostate Specific Antigen Screen   Date Value Ref Range Status   04/05/2022 1.27 0.00 - 4.00 ug/L Final       Reviewed orders with patient. Reviewed health maintenance and updated orders accordingly - Yes  Lab work is in process  Labs reviewed in EPIC  BP Readings from Last 3 Encounters:   04/25/23 116/80   04/14/22 138/88   04/11/22 120/80    Wt Readings from Last 3 Encounters:   04/25/23 93.4 kg (206 lb)   04/14/22 90.7 kg  (200 lb)   04/11/22 90.7 kg (200 lb)                  Patient Active Problem List   Diagnosis     Deviated nasal septum     Hypertension goal BP (blood pressure) < 140/90     Iliotibial band syndrome - bilateral     Other hydrocele     Gouty arthritis of left great toe     Family history of acute gouty arthritis     Hyperlipidemia LDL goal <130     Past Surgical History:   Procedure Laterality Date     HYDROCELECTOMY SCROTAL Left 8/8/2019    Procedure: hydrocelectomy;  Surgeon: Scott Silva MD;  Location: [facility]     REMOVAL OF SPERM DUCT(S)      Vasectomy       Social History     Tobacco Use     Smoking status: Never     Smokeless tobacco: Former   Vaping Use     Vaping status: Never Used   Substance Use Topics     Alcohol use: Yes     Alcohol/week: 0.0 standard drinks of alcohol     Comment: Social     Family History   Problem Relation Age of Onset     Depression Mother      C.A.D. Paternal Uncle          Current Outpatient Medications   Medication Sig Dispense Refill     allopurinol (ZYLOPRIM) 100 MG tablet Take 1 tablet (100 mg) by mouth daily 90 tablet 3     colchicine (COLCYRS) 0.6 MG tablet 1.2mg times one then 0.6mg an hour later, repeat in 3 days PRN 30 tablet 3     lisinopril-hydrochlorothiazide (ZESTORETIC) 20-25 MG tablet Take 1 tablet by mouth daily 90 tablet 3     XALATAN SOLN 0.005 % OP 1 DROP EACH EYE DAILY       Allergies   Allergen Reactions     No Known Drug Allergies      Recent Labs   Lab Test 03/25/23  0737 04/05/22  0743 09/09/21  1110 09/09/21  1110 05/07/21  1320 10/28/20  0735 07/23/19  0818 06/18/18  0731   LDL  --  64  --   --   --  68  --  89   HDL  --  48  --   --   --  44  --  52   TRIG  --  318*  --   --   --  346*  --  216*   ALT 43 37  --  38 48 38   < > 47   CR 0.99 0.85   < > 0.97 0.93 0.92   < > 0.88   GFRESTIMATED >90 >90   < > 89 >90 >90   < > >90   GFRESTBLACK  --   --   --   --  >90 >90   < > >90   POTASSIUM 5.1 4.6   < > 4.8 4.2 4.4   < > 4.7    < > = values in this  "interval not displayed.        Reviewed and updated as needed this visit by clinical staff   Tobacco  Allergies  Meds              Reviewed and updated as needed this visit by Provider                 Past Medical History:   Diagnosis Date     Hypertension      Unspecified glaucoma(365.9) 1998      Past Surgical History:   Procedure Laterality Date     HYDROCELECTOMY SCROTAL Left 8/8/2019    Procedure: hydrocelectomy;  Surgeon: Scott Silva MD;  Location: [facility]     REMOVAL OF SPERM DUCT(S)      Vasectomy       Review of Systems   Constitutional: Negative for chills and fever.   HENT: Negative for congestion, ear pain, hearing loss and sore throat.    Eyes: Negative for pain and visual disturbance.   Respiratory: Negative for cough and shortness of breath.    Cardiovascular: Negative for chest pain, palpitations and peripheral edema.   Gastrointestinal: Negative for abdominal pain, constipation, diarrhea, heartburn, hematochezia and nausea.   Genitourinary: Negative for dysuria, frequency, genital sores, hematuria, impotence, penile discharge and urgency.   Musculoskeletal: Negative for arthralgias, joint swelling and myalgias.   Skin: Negative for rash.   Neurological: Negative for dizziness, weakness, headaches and paresthesias.   Psychiatric/Behavioral: Negative for mood changes. The patient is not nervous/anxious.        OBJECTIVE:   /80   Pulse 64   Temp 97.4  F (36.3  C) (Temporal)   Resp 18   Ht 1.755 m (5' 9.09\")   Wt 93.4 kg (206 lb)   SpO2 99%   BMI 30.34 kg/m      Physical Exam  GENERAL: healthy, alert and no distress  EYES: Eyes grossly normal to inspection, PERRL and conjunctivae and sclerae normal  HENT: ear canals and TM's normal, nose and mouth without ulcers or lesions  NECK: no adenopathy, no asymmetry, masses, or scars and thyroid normal to palpation  RESP: lungs clear to auscultation - no rales, rhonchi or wheezes  CV: regular rate and rhythm, normal S1 S2, no S3 or S4, no " murmur, click or rub, no peripheral edema and peripheral pulses strong  ABDOMEN: soft, nontender, no hepatosplenomegaly, no masses and bowel sounds normal  MS: no gross musculoskeletal defects noted, no edema  SKIN: no suspicious lesions or rashes  NEURO: Normal strength and tone, mentation intact and speech normal  PSYCH: mentation appears normal, affect normal/bright    Diagnostic Test Results:  Labs reviewed in Epic    ASSESSMENT/PLAN:   Serafin was seen today for physical.    Diagnoses and all orders for this visit:    Routine history and physical examination of adult  -     CBC with platelets; Future  -     Comprehensive metabolic panel (BMP + Alb, Alk Phos, ALT, AST, Total. Bili, TP); Future  -     Lipid panel reflex to direct LDL Fasting; Future    Gouty arthritis of left great toe  -     Uric acid; Future    Hyperlipidemia LDL goal <130    Hypertension goal BP (blood pressure) < 140/90    Screen for colon cancer  -     Adult GI  Referral - Procedure Only; Future    Screening for prostate cancer  -     PSA, screen; Future    Family history of acute gouty arthritis    Glaucoma suspect, bilateral    Other orders  -     REVIEW OF HEALTH MAINTENANCE PROTOCOL ORDERS    52-year-old male here for routine physical.  Follow-up in 1 year.    Reports that his gout is under good control no new concerns recently continued takes medication on a regular basis.  Rechecking related labs today.  Adjust medication doses based on results.    LDL goal has been established.  Rechecking related labs to this today.  Follow-up based on results.    Excellent blood pressure control at this point in time.  Do continue to recommend him taking his medications as directed.  Labs pending related to kidney function and this medication.  Follow-up in 1 year would be fine.  Do recommend a blood pressure recheck with nursing staff at 6 months.    Patient has mild glaucoma that he continues to treat at the recommendations of an eye  "specialist.  Advise follow-up with ophthalmology as directed by them.    Patient has been advised of split billing requirements and indicates understanding: Yes      COUNSELING:   Reviewed preventive health counseling, as reflected in patient instructions       Regular exercise       Healthy diet/nutrition       Vision screening       Hearing screening       Aspirin prophylaxis        Alcohol Use        Safe sex practices/STD prevention       Consider Hep C screening for all patients one time for ages 18-79 years       HIV screeninx in teen years, 1x in adult years, and at intervals if high risk       Colorectal cancer screening       Prostate cancer screening       Osteoporosis prevention/bone health      BMI:   Estimated body mass index is 30.34 kg/m  as calculated from the following:    Height as of this encounter: 1.755 m (5' 9.09\").    Weight as of this encounter: 93.4 kg (206 lb).   Weight management plan: Discussed healthy diet and exercise guidelines      He reports that he has never smoked. He has quit using smokeless tobacco.        Sunny Mckeon PA-C  [facility]  "

## 2023-05-18 ENCOUNTER — TELEPHONE (OUTPATIENT)
Dept: GASTROENTEROLOGY | Facility: CLINIC | Age: 53
End: 2023-05-18
Payer: COMMERCIAL

## 2023-05-18 NOTE — TELEPHONE ENCOUNTER
Screening Questions  BLUE  KIND OF PREP RED  LOCATION [review exclusion criteria] GREEN  SEDATION TYPE        y Are you active on mychart?       Bjorn Ordering/Referring Provider?        HP What type of coverage do you have?      n Have you had a positive covid test in the last 14 days?     30.3 1. BMI  [BMI 40+ - review exclusion criteria& smart-phrase document]    y  2. Are you able to give consent for your medical care? [IF NO,RN REVIEW]          n  3. Are you taking any prescription pain medications on a routine schedule   (ex narcotics: oxycodone, roxicodone, oxycontin,  and percocet)? [RN Review]        n  3a. EXTENDED PREP What kind of prescription?     n 4. Do you have any chemical dependencies such as alcohol, street drugs, or methadone?        **If yes 3- 5 , please schedule with MAC sedation.**          IF YES TO ANY 6 - 10 - HOSPITAL SETTING ONLY.     n 6.   Do you need assistance transferring?     n 7.   Have you had a heart or lung transplant?    n 8.   Are you currently on dialysis?   n 9.   Do you use daily home oxygen?   n 10. Do you take nitroglycerin?   10a. n If yes, how often?      11. Are you currently pregnant?    11a.  If yes, how many weeks? [ Greater than 12 weeks, OR NEEDED]    n 12. Do you have Pulmonary Hypertension? *NEED PAC APPT AT [facility] w/ MAC*     n 13. [review exclusion criteria]  Do you have any implantable devices in your body (pacemaker, defib, LVAD)?    n 14. In the past 6 months, have you had any heart related issues including cardiomyopathy or heart attack?     14a. n If yes, did it require cardiac stenting if so when?     n 15. Have you had a stroke or Transient ischemic attack (TIA - aka  mini stroke ) within 6 months?      n 16. Do you have mod to severe Obstructive Sleep Apnea?  [Hospital only]    n 17. Do you have SEVERE AND UNCONTROLLED asthma? *NEED PAC APPT AT [facility] w/MAC*     18.Do you take blood thinners?  No    n 19. Do you take any of the following  "medications?    Phentermine    Ozempic    Wegovy (Semaglutide)      19a. If yes, \"Hold for 7 days before procedure.  Please consult your prescribing provider if you have questions about holding this medication.\"     n  20. Do you have chronic kidney disease?      n  21. Do you have a diagnosis of diabetes?     n  22. On a regular basis do you go 3-5 days between bowel movements?      23. Preferred LOCAL Pharmacy for Pre Prescription           [facility] - [place], [place] - [address]        - CLOSING REMINDERS -    You will receive a call from a Nurse to review instructions and health history.  This assessment must be completed prior to your procedure.  Failure to complete the Nurse assessment may result in the procedure being cancelled.      On the day of your procedure, please designatean adult(s) who can drive you home stay with you for the next 24 hours. The medicines used in the exam will make you sleepy. You will not be able to drive.      You cannot take public transportation, ride share services, or non-medical taxi service without a responsible caregiver.  Medical transport services are allowed with the requirement that a responsible caregiver will receive you at your destination.  We require that drivers and caregivers are confirmed prior to your procedure.      - SCHEDULING DETAILS -  n &  Hospital Setting Required & If yes, what is the exclusion?   [place]  Surgeon    7/10  Date of Procedure  Lower Endoscopy [Colonoscopy]  Type of Procedure Scheduled  [facility] Location   MIRALAX GATORADE WITHOUT MAGNEISUM CITRATE Which Colonoscopy Prep was Sent?     MAC Sedation Type     n PAC / Pre-op Required                 "

## 2023-07-09 NOTE — H&P
[facility] History and Physical    Serafin Hurd MRN# [id number]   Age: 52 year old YOB: 1970     Date of Admission:  (Not on file)    Home clinic: [facility]  Primary care provider: Sunny Milan          Impression and Plan:   Impression:   Screen for colon cancer [Z12.11]  No prior      Plan:   Proceed to Colonoscopy as planned.  The procedure, risks(bleeding, perforation), benefits and alternatives were discussed and the patient agrees to proceed. Cleared for Anesthesia             Chief Complaint:   Screen for colon cancer [Z12.11]    History is obtained from the patient          History of Present Illness:   This 52 year old male is being seen at this time for evaluation for colonoscopy.  No complaints or concerns.  No family hx of colon ca.           Past Medical History:     Past Medical History:   Diagnosis Date    Hypertension     Unspecified glaucoma(365.9) 1998            Past Surgical History:     Past Surgical History:   Procedure Laterality Date    HYDROCELECTOMY SCROTAL Left 8/8/2019    Procedure: hydrocelectomy;  Surgeon: Scott Silva MD;  Location: [facility]    REMOVAL OF SPERM DUCT(S)      Vasectomy            Social History:     Social History     Tobacco Use    Smoking status: Never    Smokeless tobacco: Former   Substance Use Topics    Alcohol use: Yes     Alcohol/week: 0.0 standard drinks of alcohol     Comment: Social            Family History:     Family History   Problem Relation Age of Onset    Depression Mother     C.A.D. Paternal Uncle             Immunizations:     VACCINE/DOSE   Diptheria   DPT   DTAP   HBIG   Hepatitis A   Hepatitis B   HIB   Influenza   Measles   Meningococcal   MMR   Mumps   Pneumococcal   Polio   Rubella   Small Pox   TDAP   Varicella   Zoster            Allergies:     Allergies   Allergen Reactions    No Known Drug Allergy             Medications:     No current facility-administered medications for this  encounter.     Current Outpatient Medications   Medication Sig    allopurinol (ZYLOPRIM) 100 MG tablet Take 1 tablet (100 mg) by mouth daily    colchicine (COLCYRS) 0.6 MG tablet 1.2mg times one then 0.6mg an hour later, repeat in 3 days PRN    lisinopril-hydrochlorothiazide (ZESTORETIC) 20-25 MG tablet Take 1 tablet by mouth daily    XALATAN SOLN 0.005 % OP 1 DROP EACH EYE DAILY             Review of Systems:   The review of systems was positive for the following findings.  NOne.  The remainder of the review of systems was unremarkable.          Physical Exam:   All vitals have been reviewed  There were no vitals taken for this visit.  No intake or output data in the 24 hours ending 07/09/23 1236  SHEENT examination revealed NC/AT, EOMI.  Examination of the chest revealed CTA.  Examination of the heart revealed RRR.  Examination of the abdomen revealed Soft, non tender.  The neuromuscular examination was NL.          Data:   All laboratory data reviewed  No results found for any visits on 07/10/23.  -     Trent Thayer MD, FACS

## 2023-07-10 ENCOUNTER — ANESTHESIA (OUTPATIENT)
Dept: GASTROENTEROLOGY | Facility: CLINIC | Age: 53
End: 2023-07-10
Payer: COMMERCIAL

## 2023-07-10 ENCOUNTER — ANESTHESIA EVENT (OUTPATIENT)
Dept: GASTROENTEROLOGY | Facility: CLINIC | Age: 53
End: 2023-07-10
Payer: COMMERCIAL

## 2023-07-10 ENCOUNTER — HOSPITAL ENCOUNTER (OUTPATIENT)
Facility: CLINIC | Age: 53
Discharge: HOME | End: 2023-07-10
Attending: SPECIALIST | Admitting: SPECIALIST
Payer: COMMERCIAL

## 2023-07-10 VITALS
RESPIRATION RATE: 16 BRPM | HEART RATE: 73 BPM | OXYGEN SATURATION: 97 % | DIASTOLIC BLOOD PRESSURE: 99 MMHG | TEMPERATURE: 98 F | SYSTOLIC BLOOD PRESSURE: 133 MMHG

## 2023-07-10 PROBLEM — Z86.0100 HISTORY OF COLONIC POLYPS: Status: ACTIVE | Noted: 2023-07-10

## 2023-07-10 LAB — COLONOSCOPY: NORMAL

## 2023-07-10 PROCEDURE — 370N000017 HC ANESTHESIA TECHNICAL FEE, PER MIN: Performed by: SPECIALIST

## 2023-07-10 PROCEDURE — 45382 COLONOSCOPY W/CONTROL BLEED: CPT | Performed by: SPECIALIST

## 2023-07-10 PROCEDURE — 88305 TISSUE EXAM BY PATHOLOGIST: CPT | Mod: 26

## 2023-07-10 PROCEDURE — 45380 COLONOSCOPY AND BIOPSY: CPT | Performed by: SPECIALIST

## 2023-07-10 PROCEDURE — 45385 COLONOSCOPY W/LESION REMOVAL: CPT | Mod: PT | Performed by: SPECIALIST

## 2023-07-10 PROCEDURE — 88305 TISSUE EXAM BY PATHOLOGIST: CPT | Mod: TC | Performed by: SPECIALIST

## 2023-07-10 PROCEDURE — 250N000009 HC RX 250: Performed by: SPECIALIST

## 2023-07-10 PROCEDURE — 250N000009 HC RX 250: Performed by: NURSE ANESTHETIST, CERTIFIED REGISTERED

## 2023-07-10 PROCEDURE — 45380 COLONOSCOPY AND BIOPSY: CPT | Mod: PT | Performed by: SPECIALIST

## 2023-07-10 PROCEDURE — 250N000011 HC RX IP 250 OP 636: Performed by: NURSE ANESTHETIST, CERTIFIED REGISTERED

## 2023-07-10 PROCEDURE — 258N000003 HC RX IP 258 OP 636: Performed by: NURSE ANESTHETIST, CERTIFIED REGISTERED

## 2023-07-10 RX ORDER — PROPOFOL 10 MG/ML
INJECTION, EMULSION INTRAVENOUS PRN
Status: DISCONTINUED | OUTPATIENT
Start: 2023-07-10 | End: 2023-07-10

## 2023-07-10 RX ORDER — LIDOCAINE HYDROCHLORIDE 20 MG/ML
INJECTION, SOLUTION INFILTRATION; PERINEURAL PRN
Status: DISCONTINUED | OUTPATIENT
Start: 2023-07-10 | End: 2023-07-10

## 2023-07-10 RX ORDER — SODIUM CHLORIDE, SODIUM LACTATE, POTASSIUM CHLORIDE, CALCIUM CHLORIDE 600; 310; 30; 20 MG/100ML; MG/100ML; MG/100ML; MG/100ML
INJECTION, SOLUTION INTRAVENOUS CONTINUOUS
Status: DISCONTINUED | OUTPATIENT
Start: 2023-07-10 | End: 2023-07-10 | Stop reason: HOSPADM

## 2023-07-10 RX ORDER — ONDANSETRON 2 MG/ML
4 INJECTION INTRAMUSCULAR; INTRAVENOUS EVERY 30 MIN PRN
Status: CANCELLED | OUTPATIENT
Start: 2023-07-10

## 2023-07-10 RX ORDER — LIDOCAINE 40 MG/G
CREAM TOPICAL
Status: DISCONTINUED | OUTPATIENT
Start: 2023-07-10 | End: 2023-07-10 | Stop reason: HOSPADM

## 2023-07-10 RX ORDER — PROPOFOL 10 MG/ML
INJECTION, EMULSION INTRAVENOUS CONTINUOUS PRN
Status: DISCONTINUED | OUTPATIENT
Start: 2023-07-10 | End: 2023-07-10

## 2023-07-10 RX ORDER — ACETAMINOPHEN 325 MG/1
975 TABLET ORAL EVERY 6 HOURS PRN
Status: CANCELLED | OUTPATIENT
Start: 2023-07-10

## 2023-07-10 RX ORDER — ONDANSETRON 4 MG/1
4 TABLET, ORALLY DISINTEGRATING ORAL EVERY 30 MIN PRN
Status: CANCELLED | OUTPATIENT
Start: 2023-07-10

## 2023-07-10 RX ADMIN — SODIUM CHLORIDE, POTASSIUM CHLORIDE, SODIUM LACTATE AND CALCIUM CHLORIDE: 600; 310; 30; 20 INJECTION, SOLUTION INTRAVENOUS at 07:54

## 2023-07-10 RX ADMIN — LIDOCAINE HYDROCHLORIDE 50 MG: 20 INJECTION, SOLUTION INFILTRATION; PERINEURAL at 08:46

## 2023-07-10 RX ADMIN — PROPOFOL 100 MG: 10 INJECTION, EMULSION INTRAVENOUS at 08:46

## 2023-07-10 RX ADMIN — LIDOCAINE HYDROCHLORIDE 1 ML: 10 INJECTION, SOLUTION EPIDURAL; INFILTRATION; INTRACAUDAL; PERINEURAL at 07:54

## 2023-07-10 RX ADMIN — PROPOFOL 200 MCG/KG/MIN: 10 INJECTION, EMULSION INTRAVENOUS at 08:46

## 2023-07-10 ASSESSMENT — ACTIVITIES OF DAILY LIVING (ADL): ADLS_ACUITY_SCORE: 35

## 2023-07-10 NOTE — ANESTHESIA CARE TRANSFER NOTE
Patient: Serafin Hurd    Procedure: Procedure(s):  COLONOSCOPY, WITH POLYPECTOMY  COLONOSCOPY, WITH HEMORRHAGE CONTROL       Diagnosis: Screen for colon cancer [Z12.11]  Diagnosis Additional Information: No value filed.    Anesthesia Type:   MAC     Note:    Oropharynx: oropharynx clear of all foreign objects and spontaneously breathing  Level of Consciousness: awake  Oxygen Supplementation: room air    Independent Airway: airway patency satisfactory and stable  Dentition: dentition unchanged  Vital Signs Stable: post-procedure vital signs reviewed and stable  Report to RN Given: handoff report given  Patient transferred to: Phase II    Handoff Report: Identifed the Patient, Identified the Reponsible Provider, Reviewed the pertinent medical history, Discussed the surgical course, Reviewed Intra-OP anesthesia mangement and issues during anesthesia, Set expectations for post-procedure period and Allowed opportunity for questions and acknowledgement of understanding      Vitals:  Vitals Value Taken Time   /99 07/10/23 0930   Temp     Pulse 73 07/10/23 0930   Resp 16 07/10/23 0930   SpO2 97 % 07/10/23 0930       Electronically Signed By: RICKEY Wallace CRNA  July 10, 2023  10:17 AM

## 2023-07-10 NOTE — DISCHARGE INSTRUCTIONS
[facility]    Home Care Following Endoscopy          Activity:  You have just undergone an endoscopic procedure usually performed with conscious sedation.  Do not work or operate machinery (including a car) for at least 12 hours.    I encourage you to walk and attempt to pass this air as soon as possible.    Diet:  Return to the diet you were on before your procedure but eat lightly for the first 12-24 hours.  Drink plenty of water.  Resume any regular medications unless otherwise advised by your physician.  Please begin any new medication prescribed as a result of your procedure as directed by your physician.   If you had any biopsy or polyp removed please refrain from aspirin or aspirin products for 2 days.  If on Coumadin please restart as instructed by your physician.   Pain:  You may take Tylenol as needed for pain.  Expected Recovery:  You can expect some mild abdominal fullness and/or discomfort due to the air used to inflate your intestinal tract.     Call Your Physician if You Have:    After Colonoscopy:  Worsening persisting abdominal pain which is worse with activity.  Fevers (>101 degrees F), chills or shakes.  Passage of continued blood with bowel movements.     Any questions or concerns about your recovery, please call [phone] or after hours [phone] ([phone]) Nurse Advice Line.    Follow-up Care:  You did have polyps/biopsy tissue sample(s) removed.  The polyps/biopsy tissue sample(s) will be sent to pathology.    You should receive letter in your My Chart from Dr. Thayer with your results within 1-2 weeks.   Please call if you have not received a notification of your results.  If asked to return to clinic please make an appointment 1 week after your procedure.  Call [phone].

## 2023-07-10 NOTE — ANESTHESIA POSTPROCEDURE EVALUATION
Patient: Serafin Hurd    Procedure: Procedure(s):  COLONOSCOPY, WITH POLYPECTOMY  COLONOSCOPY, WITH HEMORRHAGE CONTROL       Anesthesia Type:  MAC    Note:  Disposition: Outpatient   Postop Pain Control: Uneventful            Sign Out: Well controlled pain   PONV: No   Neuro/Psych: Uneventful            Sign Out: Acceptable/Baseline neuro status   Airway/Respiratory: Uneventful            Sign Out: Acceptable/Baseline resp. status   CV/Hemodynamics: Uneventful            Sign Out: Acceptable CV status   Other NRE: NONE   DID A NON-ROUTINE EVENT OCCUR? No    Event details/Postop Comments:  Pt was happy with anesthesia care.  No complications.  I will follow up with the pt if needed.           Last vitals:  Vitals Value Taken Time   /99 07/10/23 0930   Temp     Pulse 73 07/10/23 0930   Resp 16 07/10/23 0930   SpO2 97 % 07/10/23 0930       Electronically Signed By: RICKEY Wallace CRNA  July 10, 2023  10:17 AM

## 2023-07-10 NOTE — ANESTHESIA PREPROCEDURE EVALUATION
Anesthesia Pre-Procedure Evaluation    Patient: Serafin Hurd   MRN: [id number] : 1970        Procedure : Procedure(s):  Colonoscopy          Past Medical History:   Diagnosis Date     Hypertension      Unspecified glaucoma(365.9)       Past Surgical History:   Procedure Laterality Date     HYDROCELECTOMY SCROTAL Left 2019    Procedure: hydrocelectomy;  Surgeon: Scott Silva MD;  Location: [facility]     REMOVAL OF SPERM DUCT(S)      Vasectomy      Allergies   Allergen Reactions     No Known Drug Allergy       Social History     Tobacco Use     Smoking status: Never     Smokeless tobacco: Former   Substance Use Topics     Alcohol use: Yes     Alcohol/week: 0.0 standard drinks of alcohol     Comment: Social      Wt Readings from Last 1 Encounters:   23 93.4 kg (206 lb)        Anesthesia Evaluation   Pt has had prior anesthetic. Type: General.    No history of anesthetic complications       ROS/MED HX  ENT/Pulmonary:  - neg pulmonary ROS     Neurologic:  - neg neurologic ROS     Cardiovascular:     (+) hypertension-----Previous cardiac testing   Echo: Date: Results:    Stress Test: Date: Results:    ECG Reviewed: Date: 2019 Results:    Cath: Date: Results:      METS/Exercise Tolerance: >4 METS    Hematologic:  - neg hematologic  ROS     Musculoskeletal:  - neg musculoskeletal ROS     GI/Hepatic:     (+) bowel prep,     Renal/Genitourinary:  - neg Renal ROS     Endo:  - neg endo ROS     Psychiatric/Substance Use:  - neg psychiatric ROS     Infectious Disease:  - neg infectious disease ROS     Malignancy:  - neg malignancy ROS     Other:  - neg other ROS          Physical Exam    Airway        Mallampati: I   TM distance: > 3 FB   Neck ROM: full   Mouth opening: > 3 cm    Respiratory Devices and Support         Dental       (+) Minor Abnormalities - some fillings, tiny chips      Cardiovascular   cardiovascular exam normal          Pulmonary                   OUTSIDE LABS:  CBC:   Lab  Results   Component Value Date    WBC 3.9 (L) 04/25/2023    WBC 3.2 (L) 04/05/2022    HGB 15.0 04/25/2023    HGB 15.5 04/05/2022    HCT 41.9 04/25/2023    HCT 43.8 04/05/2022     (L) 04/25/2023     04/05/2022     BMP:   Lab Results   Component Value Date     04/25/2023     03/25/2023    POTASSIUM 4.4 04/25/2023    POTASSIUM 5.1 03/25/2023    CHLORIDE 100 04/25/2023    CHLORIDE 100 03/25/2023    CO2 28 04/25/2023    CO2 28 03/25/2023    BUN 14.4 04/25/2023    BUN 13.8 03/25/2023    CR 1.06 04/25/2023    CR 0.99 03/25/2023     (H) 04/25/2023     (H) 03/25/2023     COAGS: No results found for: PTT, INR, FIBR  POC: No results found for: BGM, HCG, HCGS  HEPATIC:   Lab Results   Component Value Date    ALBUMIN 4.5 04/25/2023    PROTTOTAL 6.8 04/25/2023    ALT 53 (H) 04/25/2023    AST 38 04/25/2023    ALKPHOS 62 04/25/2023    BILITOTAL 0.7 04/25/2023     OTHER:   Lab Results   Component Value Date    EVA 9.1 04/25/2023    TSH 1.28 08/19/2008       Anesthesia Plan    ASA Status:  1   NPO Status:  NPO Appropriate    Anesthesia Type: MAC.     - Reason for MAC: immobility needed   Induction: Propofol, Intravenous.   Maintenance: TIVA.        Consents    Anesthesia Plan(s) and associated risks, benefits, and realistic alternatives discussed. Questions answered and patient/representative(s) expressed understanding.     - Discussed: Risks, Benefits and Alternatives for BOTH SEDATION and the PROCEDURE were discussed     - Discussed with:  Patient      - Extended Intubation/Ventilatory Support Discussed: No.      - Patient is DNR/DNI Status: No    Use of blood products discussed: No .     Postoperative Care            Comments:    Other Comments: The risks and benefits of anesthesia, and the alternatives where applicable, have been discussed with the patient, and they wish to proceed.               RICKEY Wallace CRNA

## 2023-07-11 LAB
PATH REPORT.COMMENTS IMP SPEC: NORMAL
PATH REPORT.COMMENTS IMP SPEC: NORMAL
PATH REPORT.FINAL DX SPEC: NORMAL
PATH REPORT.GROSS SPEC: NORMAL
PATH REPORT.MICROSCOPIC SPEC OTHER STN: NORMAL
PATH REPORT.RELEVANT HX SPEC: NORMAL
PHOTO IMAGE: NORMAL

## 2023-11-19 ENCOUNTER — E-VISIT (OUTPATIENT)
Dept: URGENT CARE | Facility: CLINIC | Age: 53
End: 2023-11-19
Payer: COMMERCIAL

## 2023-11-19 DIAGNOSIS — R21 RASH AND NONSPECIFIC SKIN ERUPTION: Primary | ICD-10-CM

## 2023-11-19 PROCEDURE — 99207 PR NON-BILLABLE SERV PER CHARTING: CPT | Performed by: FAMILY MEDICINE

## 2023-11-19 NOTE — PATIENT INSTRUCTIONS
Dear Serafin Hurd,    We are sorry you are not feeling well. Based on the responses you provided, it is recommended that you be seen in-person in urgent care so we can better evaluate your symptoms. Please click here to find the nearest urgent care location to you.   You will not be charged for this Visit. Thank you for trusting us with your care.    Joyce Serna MD

## 2024-01-08 ENCOUNTER — PATIENT OUTREACH (OUTPATIENT)
Dept: GASTROENTEROLOGY | Facility: CLINIC | Age: 54
End: 2024-01-08
Payer: COMMERCIAL

## 2024-03-25 DIAGNOSIS — M10.9 GOUTY ARTHRITIS OF LEFT GREAT TOE: ICD-10-CM

## 2024-03-25 DIAGNOSIS — Z82.61 FAMILY HISTORY OF ACUTE GOUTY ARTHRITIS: ICD-10-CM

## 2024-03-25 DIAGNOSIS — I10 HYPERTENSION GOAL BP (BLOOD PRESSURE) < 140/90: ICD-10-CM

## 2024-03-25 RX ORDER — LISINOPRIL AND HYDROCHLOROTHIAZIDE 20; 25 MG/1; MG/1
1 TABLET ORAL DAILY
Qty: 90 TABLET | Refills: 0 | Status: SHIPPED | OUTPATIENT
Start: 2024-03-25 | End: 2024-04-26

## 2024-03-25 RX ORDER — ALLOPURINOL 100 MG/1
100 TABLET ORAL DAILY
Qty: 90 TABLET | Refills: 0 | Status: SHIPPED | OUTPATIENT
Start: 2024-03-25 | End: 2024-06-19

## 2024-03-25 RX ORDER — LISINOPRIL AND HYDROCHLOROTHIAZIDE 20; 25 MG/1; MG/1
1 TABLET ORAL DAILY
Qty: 90 TABLET | Refills: 3 | OUTPATIENT
Start: 2024-03-25

## 2024-03-25 NOTE — TELEPHONE ENCOUNTER
Patient needs a repeat blood pressure check.  It would be wise for him to make a follow-up appointment.  He will be due for physical soon.  .jdnbf

## 2024-03-25 NOTE — TELEPHONE ENCOUNTER
Patient called back and scheduled.  Patient is asking if provider can fill medication to get to appointment date.

## 2024-03-26 ENCOUNTER — ALLIED HEALTH/NURSE VISIT (OUTPATIENT)
Dept: FAMILY MEDICINE | Facility: OTHER | Age: 54
End: 2024-03-26
Payer: COMMERCIAL

## 2024-03-26 VITALS — DIASTOLIC BLOOD PRESSURE: 72 MMHG | SYSTOLIC BLOOD PRESSURE: 136 MMHG

## 2024-03-26 DIAGNOSIS — I10 HYPERTENSION GOAL BP (BLOOD PRESSURE) < 140/90: Primary | ICD-10-CM

## 2024-03-26 PROCEDURE — 99207 PR NO CHARGE NURSE ONLY: CPT

## 2024-03-26 NOTE — PROGRESS NOTES
Serafin Hurd is a 53 year old patient who comes in today for a Blood Pressure check with a Medical Assistant because of running out of BP meds.  Initial BP:  There were no vitals taken for this visit.     Blood pressure is not high.   Repeat Blood pressure: No  Patient is taking medication as prescribed.  Patient is tolerating medications well.  Is patient taking blood pressures at home? No  Current Symptoms: none    Disposition:   Abnormal Blood Pressure NO  No Huddle needed BP was within normal range. Create a telephone encounter copy this note and route to requesting provider/PCP.  No: CC this chart to requesting provider/PCP.

## 2024-04-26 ENCOUNTER — OFFICE VISIT (OUTPATIENT)
Dept: FAMILY MEDICINE | Facility: OTHER | Age: 54
End: 2024-04-26
Payer: COMMERCIAL

## 2024-04-26 VITALS
SYSTOLIC BLOOD PRESSURE: 138 MMHG | HEIGHT: 69 IN | RESPIRATION RATE: 16 BRPM | OXYGEN SATURATION: 100 % | WEIGHT: 205 LBS | HEART RATE: 68 BPM | DIASTOLIC BLOOD PRESSURE: 74 MMHG | BODY MASS INDEX: 30.36 KG/M2 | TEMPERATURE: 97.6 F

## 2024-04-26 DIAGNOSIS — E66.811 OBESITY (BMI 30.0-34.9): ICD-10-CM

## 2024-04-26 DIAGNOSIS — R73.9 HYPERGLYCEMIA: ICD-10-CM

## 2024-04-26 DIAGNOSIS — Z12.5 SCREENING FOR PROSTATE CANCER: ICD-10-CM

## 2024-04-26 DIAGNOSIS — E78.5 HYPERLIPIDEMIA LDL GOAL <130: Primary | ICD-10-CM

## 2024-04-26 DIAGNOSIS — I10 HYPERTENSION GOAL BP (BLOOD PRESSURE) < 140/90: ICD-10-CM

## 2024-04-26 DIAGNOSIS — Z00.00 ROUTINE HISTORY AND PHYSICAL EXAMINATION OF ADULT: ICD-10-CM

## 2024-04-26 DIAGNOSIS — M10.9 GOUTY ARTHRITIS OF LEFT GREAT TOE: ICD-10-CM

## 2024-04-26 LAB
ALBUMIN SERPL BCG-MCNC: 4.5 G/DL (ref 3.5–5.2)
ALP SERPL-CCNC: 56 U/L (ref 40–150)
ALT SERPL W P-5'-P-CCNC: 28 U/L (ref 0–70)
ANION GAP SERPL CALCULATED.3IONS-SCNC: 9 MMOL/L (ref 7–15)
AST SERPL W P-5'-P-CCNC: 24 U/L (ref 0–45)
BILIRUB SERPL-MCNC: 1.1 MG/DL
BUN SERPL-MCNC: 14 MG/DL (ref 6–20)
CALCIUM SERPL-MCNC: 9.7 MG/DL (ref 8.6–10)
CHLORIDE SERPL-SCNC: 100 MMOL/L (ref 98–107)
CHOLEST SERPL-MCNC: 168 MG/DL
CREAT SERPL-MCNC: 1 MG/DL (ref 0.67–1.17)
CREAT UR-MCNC: 124.4 MG/DL
DEPRECATED HCO3 PLAS-SCNC: 30 MMOL/L (ref 22–29)
EGFRCR SERPLBLD CKD-EPI 2021: 89 ML/MIN/1.73M2
ERYTHROCYTE [DISTWIDTH] IN BLOOD BY AUTOMATED COUNT: 12.1 % (ref 10–15)
FASTING STATUS PATIENT QL REPORTED: YES
GLUCOSE SERPL-MCNC: 115 MG/DL (ref 70–99)
HBA1C MFR BLD: 5.2 % (ref 0–5.6)
HCT VFR BLD AUTO: 44.6 % (ref 40–53)
HDLC SERPL-MCNC: 38 MG/DL
HGB BLD-MCNC: 16 G/DL (ref 13.3–17.7)
LDLC SERPL CALC-MCNC: 85 MG/DL
MCH RBC QN AUTO: 32.3 PG (ref 26.5–33)
MCHC RBC AUTO-ENTMCNC: 35.9 G/DL (ref 31.5–36.5)
MCV RBC AUTO: 90 FL (ref 78–100)
MICROALBUMIN UR-MCNC: <12 MG/L
MICROALBUMIN/CREAT UR: NORMAL MG/G{CREAT}
NONHDLC SERPL-MCNC: 130 MG/DL
PLATELET # BLD AUTO: 136 10E3/UL (ref 150–450)
POTASSIUM SERPL-SCNC: 5 MMOL/L (ref 3.4–5.3)
PROT SERPL-MCNC: 7.3 G/DL (ref 6.4–8.3)
PSA SERPL DL<=0.01 NG/ML-MCNC: 1.31 NG/ML (ref 0–3.5)
RBC # BLD AUTO: 4.96 10E6/UL (ref 4.4–5.9)
SODIUM SERPL-SCNC: 139 MMOL/L (ref 135–145)
TRIGL SERPL-MCNC: 226 MG/DL
URATE SERPL-MCNC: 6.4 MG/DL (ref 3.4–7)
WBC # BLD AUTO: 4 10E3/UL (ref 4–11)

## 2024-04-26 PROCEDURE — 36415 COLL VENOUS BLD VENIPUNCTURE: CPT | Performed by: PHYSICIAN ASSISTANT

## 2024-04-26 PROCEDURE — G0103 PSA SCREENING: HCPCS | Performed by: PHYSICIAN ASSISTANT

## 2024-04-26 PROCEDURE — 82570 ASSAY OF URINE CREATININE: CPT | Performed by: PHYSICIAN ASSISTANT

## 2024-04-26 PROCEDURE — 99214 OFFICE O/P EST MOD 30 MIN: CPT | Mod: 25 | Performed by: PHYSICIAN ASSISTANT

## 2024-04-26 PROCEDURE — 82043 UR ALBUMIN QUANTITATIVE: CPT | Performed by: PHYSICIAN ASSISTANT

## 2024-04-26 PROCEDURE — 83036 HEMOGLOBIN GLYCOSYLATED A1C: CPT | Performed by: PHYSICIAN ASSISTANT

## 2024-04-26 PROCEDURE — 99396 PREV VISIT EST AGE 40-64: CPT | Performed by: PHYSICIAN ASSISTANT

## 2024-04-26 PROCEDURE — 84550 ASSAY OF BLOOD/URIC ACID: CPT | Performed by: PHYSICIAN ASSISTANT

## 2024-04-26 PROCEDURE — 80061 LIPID PANEL: CPT | Performed by: PHYSICIAN ASSISTANT

## 2024-04-26 PROCEDURE — 80053 COMPREHEN METABOLIC PANEL: CPT | Performed by: PHYSICIAN ASSISTANT

## 2024-04-26 PROCEDURE — 85027 COMPLETE CBC AUTOMATED: CPT | Performed by: PHYSICIAN ASSISTANT

## 2024-04-26 RX ORDER — LISINOPRIL AND HYDROCHLOROTHIAZIDE 20; 25 MG/1; MG/1
1 TABLET ORAL DAILY
Qty: 90 TABLET | Refills: 3 | Status: SHIPPED | OUTPATIENT
Start: 2024-04-26

## 2024-04-26 SDOH — HEALTH STABILITY: PHYSICAL HEALTH: ON AVERAGE, HOW MANY DAYS PER WEEK DO YOU ENGAGE IN MODERATE TO STRENUOUS EXERCISE (LIKE A BRISK WALK)?: 1 DAY

## 2024-04-26 ASSESSMENT — PAIN SCALES - GENERAL: PAINLEVEL: NO PAIN (0)

## 2024-04-26 ASSESSMENT — SOCIAL DETERMINANTS OF HEALTH (SDOH): HOW OFTEN DO YOU GET TOGETHER WITH FRIENDS OR RELATIVES?: MORE THAN THREE TIMES A WEEK

## 2024-04-26 NOTE — PROGRESS NOTES
"Preventive Care Visit  [facility]  Sunny Milan PA-C, Family Medicine  Apr 26, 2024      Assessment & Plan     Routine history and physical examination of adult  54 y/o here for routine physical today.  Follow-up 1 year.  - CBC with platelets; Future  - Comprehensive metabolic panel (BMP + Alb, Alk Phos, ALT, AST, Total. Bili, TP); Future  - Lipid panel reflex to direct LDL Fasting; Future    Hypertension goal BP (blood pressure) < 140/90  Good control of his blood pressure today.  He is slightly on the higher side of normal.  We discussed this.  Refilled medications.  Encouraged follow-up in 6 months.  - Comprehensive metabolic panel (BMP + Alb, Alk Phos, ALT, AST, Total. Bili, TP); Future  - Lipid panel reflex to direct LDL Fasting; Future  - lisinopril-hydrochlorothiazide (ZESTORETIC) 20-25 MG tablet; Take 1 tablet by mouth daily  - Albumin Random Urine Quantitative with Creat Ratio; Future    Hyperlipidemia LDL goal <130  LDL goal established.  Labs pending.  Follow-up based on results.  - Comprehensive metabolic panel (BMP + Alb, Alk Phos, ALT, AST, Total. Bili, TP); Future  - Lipid panel reflex to direct LDL Fasting; Future    Gouty arthritis of left great toe  Historically noted with no new concerns.  Rechecking uric acid today.  Refill allopurinol PRN  - Uric acid; Future    Screening for prostate cancer  - PSA, screen; Future    Hyperglycemia  Noted hyperglycemia x 2 on past labs.  Rechecking as above and I will add hemoglobin A1c to look for prediabetic state.    - Hemoglobin A1c; Future    Obesity (BMI 30.0-34.9)  Encouraged weight loss and exercise for this.  Lifestyle modifications encouraged.  Follow-up PRN    BMI  Estimated body mass index is 30.27 kg/m  as calculated from the following:    Height as of this encounter: 1.753 m (5' 9\").    Weight as of this encounter: 93 kg (205 lb).   Weight management plan: Discussed healthy diet and exercise " guidelines    Counseling  Appropriate preventive services were discussed with this patient, including applicable screening as appropriate for fall prevention, nutrition, physical activity, Tobacco-use cessation, weight loss and cognition.  Checklist reviewing preventive services available has been given to the patient.  Reviewed patient's diet, addressing concerns and/or questions.   He is at risk for lack of exercise and has been provided with information to increase physical activity for the benefit of his well-being.   He is at risk for psychosocial distress and has been provided with information to reduce risk.       Work on weight loss  Regular exercise    Starr Betancourt is a 53 year old, presenting for the following:  Physical        4/26/2024     7:07 AM   Additional Questions   Roomed by Diane   Accompanied by Self         4/26/2024     7:07 AM   Patient Reported Additional Medications   Patient reports taking the following new medications Steroid cream PRN        Health Care Directive  Patient does not have a Health Care Directive or Living Will: Discussed advance care planning with patient; information given to patient to review.    HPI    Had a rash and some steroid cream still PRN: seen at urgent care         4/26/2024   General Health   How would you rate your overall physical health? Good   Feel stress (tense, anxious, or unable to sleep) Only a little   (!) STRESS CONCERN      4/26/2024   Nutrition   Three or more servings of calcium each day? Yes   Diet: I don't know   How many servings of fruit and vegetables per day? (!) 2-3   How many sweetened beverages each day? 0-1         4/26/2024   Exercise   Days per week of moderate/strenous exercise 1 day   (!) EXERCISE CONCERN      4/26/2024   Social Factors   Frequency of gathering with friends or relatives More than three times a week   Worry food won't last until get money to buy more No   Food not last or not have enough money for food? No   Do  you have housing?  Yes   Are you worried about losing your housing? No   Lack of transportation? No   Unable to get utilities (heat,electricity)? No         4/26/2024   Fall Risk   Fallen 2 or more times in the past year? No   Trouble with walking or balance? No          4/26/2024   Dental   Dentist two times every year? Yes         4/26/2024   TB Screening   Were you born outside of the [place]? No         Today's PHQ-2 Score:       4/26/2024     7:06 AM   PHQ-2 ( 1999 [employer])   Q1: Little interest or pleasure in doing things 0   Q2: Feeling down, depressed or hopeless 0   PHQ-2 Score 0   Q1: Little interest or pleasure in doing things Not at all   Q2: Feeling down, depressed or hopeless Not at all   PHQ-2 Score 0           4/26/2024   Substance Use   Alcohol more than 3/day or more than 7/wk No   Do you use any other substances recreationally? (!) ALCOHOL     Social History     Tobacco Use    Smoking status: Never    Smokeless tobacco: Former   Vaping Use    Vaping status: Never Used   Substance Use Topics    Alcohol use: Yes     Alcohol/week: 0.0 standard drinks of alcohol     Comment: Social    Drug use: No           4/26/2024   STI Screening   New sexual partner(s) since last STI/HIV test? No   Last PSA:   PSA   Date Value Ref Range Status   10/28/2020 1.19 0 - 4 ug/L Final     Comment:     Assay Method:  Chemiluminescence using Siemens Vista analyzer     Prostate Specific Antigen Screen   Date Value Ref Range Status   04/25/2023 1.16 0.00 - 3.50 ng/mL Final   04/05/2022 1.27 0.00 - 4.00 ug/L Final     ASCVD Risk   The 10-year ASCVD risk score (Kylee MCCALL, et al., 2019) is: 8.6%    Values used to calculate the score:      Age: 53 years      Sex: Male      Is Non- : No      Diabetic: No      Tobacco smoker: No      Systolic Blood Pressure: 138 mmHg      Is BP treated: Yes      HDL Cholesterol: 38 mg/dL      Total Cholesterol: 184 mg/dL       Reviewed and updated as needed this visit by  Provider     Meds                Past Medical History:   Diagnosis Date    History of colonic polyps 7/10/2023    Hypertension     Unspecified glaucoma(365.9) 1998     Past Surgical History:   Procedure Laterality Date    COLONOSCOPY N/A 7/10/2023    Procedure: COLONOSCOPY, WITH POLYPECTOMY;  Surgeon: Trent Thayer MD;  Location: [facility]    HYDROCELECTOMY SCROTAL Left 8/8/2019    Procedure: hydrocelectomy;  Surgeon: Scott Silva MD;  Location: [facility]    REMOVAL OF SPERM DUCT(S)      Vasectomy     Lab work is in process  Labs reviewed in EPIC  BP Readings from Last 3 Encounters:   04/26/24 138/74   03/26/24 136/72   07/10/23 (!) 133/99    Wt Readings from Last 3 Encounters:   04/26/24 93 kg (205 lb)   04/25/23 93.4 kg (206 lb)   04/14/22 90.7 kg (200 lb)                  Patient Active Problem List   Diagnosis    Deviated nasal septum    Hypertension goal BP (blood pressure) < 140/90    Iliotibial band syndrome - bilateral    Other hydrocele    Gouty arthritis of left great toe    Family history of acute gouty arthritis    Hyperlipidemia LDL goal <130    Glaucoma suspect, bilateral    History of colonic polyps    Obesity (BMI 30.0-34.9)     Past Surgical History:   Procedure Laterality Date    COLONOSCOPY N/A 7/10/2023    Procedure: COLONOSCOPY, WITH POLYPECTOMY;  Surgeon: Trent Thayer MD;  Location: [facility]    HYDROCELECTOMY SCROTAL Left 8/8/2019    Procedure: hydrocelectomy;  Surgeon: Scott Silva MD;  Location: [facility]    REMOVAL OF SPERM DUCT(S)      Vasectomy       Social History     Tobacco Use    Smoking status: Never    Smokeless tobacco: Former   Substance Use Topics    Alcohol use: Yes     Alcohol/week: 0.0 standard drinks of alcohol     Comment: Social     Family History   Problem Relation Age of Onset    Depression Mother     C.A.D. Paternal Uncle          Current Outpatient Medications   Medication Sig Dispense Refill    allopurinol (ZYLOPRIM) 100 MG tablet Take 1 tablet (100 mg) by  "mouth daily 90 tablet 0    lisinopril-hydrochlorothiazide (ZESTORETIC) 20-25 MG tablet Take 1 tablet by mouth daily 90 tablet 3    XALATAN SOLN 0.005 % OP 1 DROP EACH EYE DAILY       Allergies   Allergen Reactions    No Known Drug Allergy      Recent Labs   Lab Test 04/25/23  0716 03/25/23  0737 04/05/22  0743 09/09/21  1110 05/07/21  1320 10/28/20  0735     --  64  --   --  68   HDL 38*  --  48  --   --  44   TRIG 232*  --  318*  --   --  346*   ALT 53* 43 37   < > 48 38   CR 1.06 0.99 0.85   < > 0.93 0.92   GFRESTIMATED 83 >90 >90   < > >90 >90   GFRESTBLACK  --   --   --   --  >90 >90   POTASSIUM 4.4 5.1 4.6   < > 4.2 4.4    < > = values in this interval not displayed.          Review of Systems  CONSTITUTIONAL: NEGATIVE for fever, chills, change in weight  INTEGUMENTARY/SKIN: NEGATIVE for worrisome rashes, moles or lesions  EYES: NEGATIVE for vision changes or irritation  ENT/MOUTH: NEGATIVE for ear, mouth and throat problems  RESP: NEGATIVE for significant cough or SOB  BREAST: NEGATIVE for masses, tenderness or discharge  CV: NEGATIVE for chest pain, palpitations or peripheral edema  GI: NEGATIVE for nausea, abdominal pain, heartburn, or change in bowel habits  : NEGATIVE for frequency, dysuria, or hematuria  MUSCULOSKELETAL: NEGATIVE for significant arthralgias or myalgia  NEURO: NEGATIVE for weakness, dizziness or paresthesias  ENDOCRINE: NEGATIVE for temperature intolerance, skin/hair changes  HEME: NEGATIVE for bleeding problems  PSYCHIATRIC: NEGATIVE for changes in mood or affect     Objective    Exam  /74   Pulse 68   Temp 97.6  F (36.4  C) (Temporal)   Resp 16   Ht 1.753 m (5' 9\")   Wt 93 kg (205 lb)   SpO2 100%   BMI 30.27 kg/m     Estimated body mass index is 30.27 kg/m  as calculated from the following:    Height as of this encounter: 1.753 m (5' 9\").    Weight as of this encounter: 93 kg (205 lb).    Physical Exam  GENERAL: alert and no distress  EYES: Eyes grossly normal to " inspection, PERRL and conjunctivae and sclerae normal  HENT: ear canals and TM's normal, nose and mouth without ulcers or lesions  NECK: no adenopathy, no asymmetry, masses, or scars  RESP: lungs clear to auscultation - no rales, rhonchi or wheezes  CV: regular rate and rhythm, normal S1 S2, no S3 or S4, no murmur, click or rub, no peripheral edema  ABDOMEN: soft, nontender, no hepatosplenomegaly, no masses and bowel sounds normal  MS: no gross musculoskeletal defects noted, no edema  SKIN: no suspicious lesions or rashes  NEURO: Normal strength and tone, mentation intact and speech normal  PSYCH: mentation appears normal, affect normal/bright        Signed Electronically by: Sunny Milan PA-C

## 2024-06-19 DIAGNOSIS — Z82.61 FAMILY HISTORY OF ACUTE GOUTY ARTHRITIS: ICD-10-CM

## 2024-06-19 DIAGNOSIS — M10.9 GOUTY ARTHRITIS OF LEFT GREAT TOE: ICD-10-CM

## 2024-06-19 RX ORDER — ALLOPURINOL 100 MG/1
100 TABLET ORAL DAILY
Qty: 90 TABLET | Refills: 0 | Status: SHIPPED | OUTPATIENT
Start: 2024-06-19 | End: 2024-09-23

## 2024-09-22 DIAGNOSIS — Z82.61 FAMILY HISTORY OF ACUTE GOUTY ARTHRITIS: ICD-10-CM

## 2024-09-22 DIAGNOSIS — M10.9 GOUTY ARTHRITIS OF LEFT GREAT TOE: ICD-10-CM

## 2024-09-23 RX ORDER — ALLOPURINOL 100 MG/1
100 TABLET ORAL DAILY
Qty: 90 TABLET | Refills: 0 | Status: SHIPPED | OUTPATIENT
Start: 2024-09-23

## 2024-12-18 DIAGNOSIS — Z82.61 FAMILY HISTORY OF ACUTE GOUTY ARTHRITIS: ICD-10-CM

## 2024-12-18 DIAGNOSIS — M10.9 GOUTY ARTHRITIS OF LEFT GREAT TOE: ICD-10-CM

## 2024-12-19 RX ORDER — ALLOPURINOL 100 MG/1
100 TABLET ORAL DAILY
Qty: 90 TABLET | Refills: 0 | Status: SHIPPED | OUTPATIENT
Start: 2024-12-19

## 2025-03-16 DIAGNOSIS — M10.9 GOUTY ARTHRITIS OF LEFT GREAT TOE: ICD-10-CM

## 2025-03-16 DIAGNOSIS — Z82.61 FAMILY HISTORY OF ACUTE GOUTY ARTHRITIS: ICD-10-CM

## 2025-03-17 RX ORDER — ALLOPURINOL 100 MG/1
100 TABLET ORAL DAILY
Qty: 90 TABLET | Refills: 0 | OUTPATIENT
Start: 2025-03-17

## 2025-03-17 NOTE — TELEPHONE ENCOUNTER
No further refills without office visit.  Overdue for related labs. Electronically signed: Sunny Mckeon PA-C

## 2025-03-17 NOTE — TELEPHONE ENCOUNTER
#1 Called pt, lvm to call the clinic and schedule appt with JDH and labs, #2 MYC message was also sent, postponing to see if pt calls/reads message.

## 2025-03-27 ENCOUNTER — PATIENT OUTREACH (OUTPATIENT)
Dept: CARE COORDINATION | Facility: CLINIC | Age: 55
End: 2025-03-27
Payer: COMMERCIAL

## 2025-04-01 ENCOUNTER — OFFICE VISIT (OUTPATIENT)
Dept: FAMILY MEDICINE | Facility: OTHER | Age: 55
End: 2025-04-01
Payer: COMMERCIAL

## 2025-04-01 VITALS
TEMPERATURE: 96.9 F | DIASTOLIC BLOOD PRESSURE: 81 MMHG | BODY MASS INDEX: 28.42 KG/M2 | HEART RATE: 60 BPM | HEIGHT: 70 IN | SYSTOLIC BLOOD PRESSURE: 135 MMHG | WEIGHT: 198.5 LBS | RESPIRATION RATE: 16 BRPM | OXYGEN SATURATION: 99 %

## 2025-04-01 DIAGNOSIS — Z00.00 ROUTINE HISTORY AND PHYSICAL EXAMINATION OF ADULT: Primary | ICD-10-CM

## 2025-04-01 DIAGNOSIS — E78.5 HYPERLIPIDEMIA LDL GOAL <100: ICD-10-CM

## 2025-04-01 DIAGNOSIS — I10 HYPERTENSION GOAL BP (BLOOD PRESSURE) < 140/90: ICD-10-CM

## 2025-04-01 DIAGNOSIS — L30.9 DERMATITIS: ICD-10-CM

## 2025-04-01 DIAGNOSIS — M10.9 GOUTY ARTHRITIS OF LEFT GREAT TOE: ICD-10-CM

## 2025-04-01 DIAGNOSIS — Z82.61 FAMILY HISTORY OF ACUTE GOUTY ARTHRITIS: ICD-10-CM

## 2025-04-01 DIAGNOSIS — Z12.5 SCREENING FOR PROSTATE CANCER: ICD-10-CM

## 2025-04-01 LAB
ALBUMIN SERPL BCG-MCNC: 5 G/DL (ref 3.5–5.2)
ALP SERPL-CCNC: 62 U/L (ref 40–150)
ALT SERPL W P-5'-P-CCNC: 34 U/L (ref 0–70)
ANION GAP SERPL CALCULATED.3IONS-SCNC: 8 MMOL/L (ref 7–15)
AST SERPL W P-5'-P-CCNC: 26 U/L (ref 0–45)
BILIRUB SERPL-MCNC: 0.7 MG/DL
BUN SERPL-MCNC: 12.8 MG/DL (ref 6–20)
CALCIUM SERPL-MCNC: 9.7 MG/DL (ref 8.8–10.4)
CHLORIDE SERPL-SCNC: 100 MMOL/L (ref 98–107)
CHOLEST SERPL-MCNC: 190 MG/DL
CREAT SERPL-MCNC: 0.84 MG/DL (ref 0.67–1.17)
EGFRCR SERPLBLD CKD-EPI 2021: >90 ML/MIN/1.73M2
ERYTHROCYTE [DISTWIDTH] IN BLOOD BY AUTOMATED COUNT: 11.8 % (ref 10–15)
FASTING STATUS PATIENT QL REPORTED: YES
FASTING STATUS PATIENT QL REPORTED: YES
GLUCOSE SERPL-MCNC: 111 MG/DL (ref 70–99)
HCO3 SERPL-SCNC: 30 MMOL/L (ref 22–29)
HCT VFR BLD AUTO: 44 % (ref 40–53)
HDLC SERPL-MCNC: 45 MG/DL
HGB BLD-MCNC: 16 G/DL (ref 13.3–17.7)
LDLC SERPL CALC-MCNC: 78 MG/DL
MCH RBC QN AUTO: 32.7 PG (ref 26.5–33)
MCHC RBC AUTO-ENTMCNC: 36.4 G/DL (ref 31.5–36.5)
MCV RBC AUTO: 90 FL (ref 78–100)
NONHDLC SERPL-MCNC: 145 MG/DL
PLATELET # BLD AUTO: 142 10E3/UL (ref 150–450)
POTASSIUM SERPL-SCNC: 4.7 MMOL/L (ref 3.4–5.3)
PROT SERPL-MCNC: 7.3 G/DL (ref 6.4–8.3)
PSA SERPL DL<=0.01 NG/ML-MCNC: 1.14 NG/ML (ref 0–3.5)
RBC # BLD AUTO: 4.9 10E6/UL (ref 4.4–5.9)
SODIUM SERPL-SCNC: 138 MMOL/L (ref 135–145)
TRIGL SERPL-MCNC: 335 MG/DL
TSH SERPL DL<=0.005 MIU/L-ACNC: 2.55 UIU/ML (ref 0.3–4.2)
URATE SERPL-MCNC: 6.9 MG/DL (ref 3.4–7)
WBC # BLD AUTO: 4.6 10E3/UL (ref 4–11)

## 2025-04-01 PROCEDURE — 36415 COLL VENOUS BLD VENIPUNCTURE: CPT | Performed by: PHYSICIAN ASSISTANT

## 2025-04-01 PROCEDURE — G0103 PSA SCREENING: HCPCS | Performed by: PHYSICIAN ASSISTANT

## 2025-04-01 PROCEDURE — 1126F AMNT PAIN NOTED NONE PRSNT: CPT | Performed by: PHYSICIAN ASSISTANT

## 2025-04-01 PROCEDURE — G2211 COMPLEX E/M VISIT ADD ON: HCPCS | Performed by: PHYSICIAN ASSISTANT

## 2025-04-01 PROCEDURE — 3075F SYST BP GE 130 - 139MM HG: CPT | Performed by: PHYSICIAN ASSISTANT

## 2025-04-01 PROCEDURE — 80061 LIPID PANEL: CPT | Performed by: PHYSICIAN ASSISTANT

## 2025-04-01 PROCEDURE — 84443 ASSAY THYROID STIM HORMONE: CPT | Performed by: PHYSICIAN ASSISTANT

## 2025-04-01 PROCEDURE — 85027 COMPLETE CBC AUTOMATED: CPT | Performed by: PHYSICIAN ASSISTANT

## 2025-04-01 PROCEDURE — 84550 ASSAY OF BLOOD/URIC ACID: CPT | Performed by: PHYSICIAN ASSISTANT

## 2025-04-01 PROCEDURE — 99396 PREV VISIT EST AGE 40-64: CPT | Performed by: PHYSICIAN ASSISTANT

## 2025-04-01 PROCEDURE — 3079F DIAST BP 80-89 MM HG: CPT | Performed by: PHYSICIAN ASSISTANT

## 2025-04-01 PROCEDURE — 80053 COMPREHEN METABOLIC PANEL: CPT | Performed by: PHYSICIAN ASSISTANT

## 2025-04-01 PROCEDURE — 99214 OFFICE O/P EST MOD 30 MIN: CPT | Mod: 25 | Performed by: PHYSICIAN ASSISTANT

## 2025-04-01 RX ORDER — LISINOPRIL AND HYDROCHLOROTHIAZIDE 20; 25 MG/1; MG/1
1 TABLET ORAL DAILY
Qty: 90 TABLET | Refills: 3 | Status: SHIPPED | OUTPATIENT
Start: 2025-04-01

## 2025-04-01 RX ORDER — ALLOPURINOL 100 MG/1
100 TABLET ORAL DAILY
Qty: 90 TABLET | Refills: 0 | Status: SHIPPED | OUTPATIENT
Start: 2025-04-01

## 2025-04-01 RX ORDER — ALLOPURINOL 100 MG/1
100 TABLET ORAL DAILY
Qty: 90 TABLET | Refills: 0 | Status: CANCELLED | OUTPATIENT
Start: 2025-04-01

## 2025-04-01 RX ORDER — TRIAMCINOLONE ACETONIDE 1 MG/G
CREAM TOPICAL 2 TIMES DAILY
Qty: 90 G | Refills: 1 | Status: SHIPPED | OUTPATIENT
Start: 2025-04-01

## 2025-04-01 ASSESSMENT — PAIN SCALES - GENERAL: PAINLEVEL_OUTOF10: NO PAIN (0)

## 2025-04-01 NOTE — PROGRESS NOTES
"  Assessment & Plan     Routine history and physical examination of adult  54-year-old male here for routine physical.  Related labs pending.  Follow-up in 1 year.  - CBC with platelets; Future  - Comprehensive metabolic panel (BMP + Alb, Alk Phos, ALT, AST, Total. Bili, TP); Future  - Lipid panel reflex to direct LDL Fasting; Future    Gouty arthritis of left great toe  Family history of acute gouty arthritis  Stable at this point in time with no new concerns.  Related labs pending.  Refilled medications as requested.  Follow-up as needed.  - allopurinol (ZYLOPRIM) 100 MG tablet; Take 1 tablet (100 mg) by mouth daily.  - Uric acid; Future  - TSH with free T4 reflex; Future    Hypertension goal BP (blood pressure) < 140/90  Blood pressure under good control at this point in time.  Refilled medications as requested.  Follow-up in 6 months is encouraged.  - TSH with free T4 reflex; Future  - lisinopril-hydrochlorothiazide (ZESTORETIC) 20-25 MG tablet; Take 1 tablet by mouth daily.  - CT Coronary Calcium Scan; Future    Hyperlipidemia LDL goal <100  LDL goal based on age and hypertension.  Related labs pending.  Follow-up based on results.  He requested a CT calcium scoring and orders are placed for him today.  Family history is positive.  - TSH with free T4 reflex; Future  - CT Coronary Calcium Scan; Future    Dermatitis  Multiple intermittent areas.  Refilled medication that he has had from the past.  - triamcinolone (KENALOG) 0.1 % external cream; Apply topically 2 times daily.    Screening for prostate cancer  Screening due.  - PSA, screen; Future          BMI  Estimated body mass index is 28.58 kg/m  as calculated from the following:    Height as of this encounter: 1.775 m (5' 9.88\").    Weight as of this encounter: 90 kg (198 lb 8 oz).   Weight management plan: Discussed healthy diet and exercise guidelines      Work on weight loss  Regular exercise    Starr Betancourt is a 54 year old, presenting for the " "following health issues:  Hypertension and Recheck Medication      4/1/2025     7:03 AM   Additional Questions   Roomed by [place]   Accompanied by self     History of Present Illness       Hypertension: He presents for follow up of hypertension.  He does not check blood pressure  regularly outside of the clinic. Outside blood pressures have been over 140/90. He follows a low salt diet.     Reason for visit:  Annual visit to renew medications and blood work.  I would like to get a CACT calcium score test done.    He eats 4 or more servings of fruits and vegetables daily.He consumes 0 sweetened beverage(s) daily.He exercises with enough effort to increase his heart rate 9 or less minutes per day.  He exercises with enough effort to increase his heart rate 3 or less days per week.   He is taking medications regularly.        Medication Followup of Allopurinol  Taking Medication as prescribed: yes  Side Effects:  None  Medication Helping Symptoms:  yes      Review of Systems  Constitutional, HEENT, cardiovascular, pulmonary, GI, , musculoskeletal, neuro, skin, endocrine and psych systems are negative, except as otherwise noted.      Objective    /81   Pulse 60   Temp 96.9  F (36.1  C) (Temporal)   Resp 16   Ht 1.775 m (5' 9.88\")   Wt 90 kg (198 lb 8 oz)   SpO2 99%   BMI 28.58 kg/m    Body mass index is 28.58 kg/m .  Physical Exam   GENERAL: alert and no distress  EYES: Eyes grossly normal to inspection, PERRL and conjunctivae and sclerae normal  HENT: ear canals and TM's normal, nose and mouth without ulcers or lesions  NECK: no adenopathy, no asymmetry, masses, or scars  RESP: lungs clear to auscultation - no rales, rhonchi or wheezes  CV: regular rate and rhythm, normal S1 S2, no S3 or S4, no murmur, click or rub, no peripheral edema  ABDOMEN: soft, nontender, no hepatosplenomegaly, no masses and bowel sounds normal  MS: no gross musculoskeletal defects noted, no edema  SKIN: no suspicious lesions or " rashes  NEURO: Normal strength and tone, mentation intact and speech normal  PSYCH: mentation appears normal, affect normal/bright    No results found for this or any previous visit (from the past 24 hours).        Signed Electronically by: Sunny Milan PA-C

## 2025-04-10 ENCOUNTER — PATIENT OUTREACH (OUTPATIENT)
Dept: CARE COORDINATION | Facility: CLINIC | Age: 55
End: 2025-04-10
Payer: COMMERCIAL

## 2025-04-17 ENCOUNTER — ANCILLARY PROCEDURE (OUTPATIENT)
Dept: CT IMAGING | Facility: CLINIC | Age: 55
End: 2025-04-17
Attending: PHYSICIAN ASSISTANT
Payer: COMMERCIAL

## 2025-04-17 DIAGNOSIS — E78.5 HYPERLIPIDEMIA LDL GOAL <100: ICD-10-CM

## 2025-04-17 DIAGNOSIS — I10 HYPERTENSION GOAL BP (BLOOD PRESSURE) < 140/90: ICD-10-CM

## 2025-04-17 PROCEDURE — 75571 CT HRT W/O DYE W/CA TEST: CPT | Mod: GA | Performed by: STUDENT IN AN ORGANIZED HEALTH CARE EDUCATION/TRAINING PROGRAM

## 2025-05-16 ENCOUNTER — TELEPHONE (OUTPATIENT)
Dept: FAMILY MEDICINE | Facility: OTHER | Age: 55
End: 2025-05-16
Payer: COMMERCIAL

## 2025-05-16 NOTE — TELEPHONE ENCOUNTER
New Medication Request    Contacts       Contact Date/Time Type Contact Phone/Fax    05/16/2025 05:53 PM CDT Phone (Incoming) Raymondsusan Stevie (Self) [phone] (M)            What medication are you requesting?: Patient     Reason for medication request:  of plantar fasciitis - flare-up    Have you taken this medication before?: N/A pt has taken or used medication for this that was prescribed before. Pt doesn't  remember the name of it.     Controlled Substance Agreement on file:   CSA -- Patient Level:    CSA: None found at the patient level.         Patient offered an appointment? No    Preferred Pharmacy:   [facility] [address] [place] [address] - [address]  [address]  [facility] [address]  Phone: [phone] Fax: [phone]      Could we send this information to you in [facility] or would you prefer to receive a phone call?:   Patient would prefer a phone call   Okay to leave a detailed message?: Yes at Cell number on file:    Telephone Information:   Mobile [phone]

## 2025-05-19 NOTE — TELEPHONE ENCOUNTER
Patient Contact    Attempt # 1    Was call answered?  No.  Left message on voicemail with information to call clinic and ask to speak to a Triage Nurse. Upon callback, please triage symptoms and discuss new medication request for plantar fasciitis.    Felicia Ryan RN on 5/19/2025 at 11:25 AM

## 2025-05-21 NOTE — TELEPHONE ENCOUNTER
RN spoke with patient reports that symptoms have resolved and he is not needing any follow up at this time. Will reach out to care team if needing anything further.     Marj Mcqueen, RN, BSN

## 2025-06-28 DIAGNOSIS — M10.9 GOUTY ARTHRITIS OF LEFT GREAT TOE: ICD-10-CM

## 2025-06-28 DIAGNOSIS — Z82.61 FAMILY HISTORY OF ACUTE GOUTY ARTHRITIS: ICD-10-CM

## 2025-06-30 RX ORDER — ALLOPURINOL 100 MG/1
100 TABLET ORAL DAILY
Qty: 90 TABLET | Refills: 0 | OUTPATIENT
Start: 2025-06-30

## 2025-06-30 NOTE — TELEPHONE ENCOUNTER
Patient informed via mychart to schedule. 3 day reminder if not read to call/send letter.   Coleen Abraham MA

## 2025-06-30 NOTE — TELEPHONE ENCOUNTER
Due for related labs  No further refills without office visit. Electronically signed: Sunny Mckeon PA-C

## 2025-07-01 ENCOUNTER — OFFICE VISIT (OUTPATIENT)
Dept: FAMILY MEDICINE | Facility: CLINIC | Age: 55
End: 2025-07-01
Payer: COMMERCIAL

## 2025-07-01 VITALS
TEMPERATURE: 97.9 F | SYSTOLIC BLOOD PRESSURE: 130 MMHG | HEIGHT: 70 IN | HEART RATE: 70 BPM | WEIGHT: 205 LBS | BODY MASS INDEX: 29.35 KG/M2 | RESPIRATION RATE: 15 BRPM | OXYGEN SATURATION: 99 % | DIASTOLIC BLOOD PRESSURE: 78 MMHG

## 2025-07-01 DIAGNOSIS — M10.9 GOUTY ARTHRITIS OF LEFT GREAT TOE: ICD-10-CM

## 2025-07-01 DIAGNOSIS — Z82.61 FAMILY HISTORY OF ACUTE GOUTY ARTHRITIS: ICD-10-CM

## 2025-07-01 DIAGNOSIS — R73.03 PREDIABETES: ICD-10-CM

## 2025-07-01 DIAGNOSIS — E78.5 HYPERLIPIDEMIA LDL GOAL <100: ICD-10-CM

## 2025-07-01 DIAGNOSIS — I10 HYPERTENSION GOAL BP (BLOOD PRESSURE) < 140/90: Primary | ICD-10-CM

## 2025-07-01 PROCEDURE — 99214 OFFICE O/P EST MOD 30 MIN: CPT | Performed by: NURSE PRACTITIONER

## 2025-07-01 PROCEDURE — 3078F DIAST BP <80 MM HG: CPT | Performed by: NURSE PRACTITIONER

## 2025-07-01 PROCEDURE — 3075F SYST BP GE 130 - 139MM HG: CPT | Performed by: NURSE PRACTITIONER

## 2025-07-01 PROCEDURE — 1126F AMNT PAIN NOTED NONE PRSNT: CPT | Performed by: NURSE PRACTITIONER

## 2025-07-01 RX ORDER — ROSUVASTATIN CALCIUM 5 MG/1
5 TABLET, COATED ORAL DAILY
Qty: 90 TABLET | Refills: 0 | Status: SHIPPED | OUTPATIENT
Start: 2025-07-01

## 2025-07-01 RX ORDER — ALLOPURINOL 100 MG/1
100 TABLET ORAL DAILY
Qty: 90 TABLET | Refills: 0 | Status: SHIPPED | OUTPATIENT
Start: 2025-07-01

## 2025-07-01 ASSESSMENT — PAIN SCALES - GENERAL: PAINLEVEL_OUTOF10: NO PAIN (0)

## 2025-07-01 NOTE — PROGRESS NOTES
Assessment & Plan     ASSESSMENT/ORDERS:    ICD-10-CM    1. Hypertension goal BP (blood pressure) < 140/90  I10 Comprehensive metabolic panel      2. Hyperlipidemia LDL goal <100  E78.5 Comprehensive metabolic panel     rosuvastatin (CRESTOR) 5 MG tablet     Lipid panel reflex to direct LDL Fasting      3. Gouty arthritis of left great toe  M10.9 Uric acid     allopurinol (ZYLOPRIM) 100 MG tablet      4. Prediabetes  R73.03 Hemoglobin A1c      5. Family history of acute gouty arthritis  Z82.61 allopurinol (ZYLOPRIM) 100 MG tablet          Assessment & Plan  Hypertension:  - at goal.   - Blood pressure running high, with a focus on the bottom number indicating resting pressure in the heart.  - Increase lisinopril dosage and discontinue diuretic. - Discussed reducing alcohol intake to help lower blood pressure.    Hyperlipidemia:  - LDL cholesterol higher than desired. CAC score is zero, ok for Crestor 5 mg.  - Add secondary cholesterol medication, best taken in the evening. Recheck labs in 3 months.  - Discussed dietary changes to manage cholesterol levels.    Gouty arthritis:  - Uric acid level was in the 7s in April, indicating potential flares.  - Increase allopurinol to 200 mg daily. Recheck uric acid level in 3 months.  - Discussed reducing alcohol intake to potentially reduce gout flares. Pt. Thinks he can do this independently.     Prediabetes:  - Prediabetic process related to blood sugar.  - Consider metformin daily in an extended-release form. Strong recommendation for exercise changes to lower blood sugars naturally. Recheck in 3 months.  - Discussed exercise changes to improve blood sugar levels.    Alcohol consumption:  - High alcohol intake contributing to health issues.  - Decrease alcohol intake to 8 drinks a week or less. Consider treatment or zero alcohol if unable to decrease.    AI and/or dictation software was used for the creation of this note.    The longitudinal plan of care for the  diagnosis(es)/condition(s) as documented were addressed during this visit. Due to the added complexity in care, I will continue to support Serafin in the subsequent management and with ongoing continuity of care.               Follow-up    Follow-up Visit   Expected date:  Sep 17, 2025 (Approximate)      Follow Up Appointment Details:     Follow-up with whom?: Other Primary Care Services    Follow-Up for what?: Lab Visit    How?: In Person    Is this an as-needed follow-up?: No                 Subjective   Serafin is a 54 year old, presenting for the following health issues:  Recheck Medication        7/1/2025     9:23 AM   Additional Questions   Roomed by Janki XIONG   Accompanied by Fany         7/1/2025     9:23 AM   Patient Reported Additional Medications   Patient reports taking the following new medications NA     History of Present Illness       Reason for visit:  Renew meds   He is taking medications regularly.      History of Present Illness-  Serafin Hurd, 54 years    Gout  - Previous visit in April noted uric acid level in the 7s, specifically 6.9  - History of gout flares  - Currently on allopurinol    Prediabetes  - Blood sugar levels indicate a prediabetic state  - Previous recommendation for lifestyle changes to manage blood sugar    Hypertension  - Blood pressure was running high during the last visit in April  - History of high blood pressure    Alcohol Consumption  - Reports consuming four to five beers a night  - Completed dry December and dry January in previous years without issues  - calcium CT score- zero    Cholesterol  - LDL cholesterol was noted to be higher than ideal during a previous visit  - Previous recommendation for lifestyle changes to manage cholesterol levels    Social History and Life Stressors  - Occupation: Principal at a high school, 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       Hypertension  "Follow-up    Do you check your blood pressure regularly outside of the clinic? No   Are you following a low salt diet? Yes  Are your blood pressures ever more than 140 on the top number (systolic) OR more   than 90 on the bottom number (diastolic), for example 140/90? N/A    BP Readings from Last 2 Encounters:   07/01/25 (!) 140/92   04/01/25 135/81           Review of Systems  Constitutional, HEENT, cardiovascular, pulmonary, gi and gu systems are negative, except as otherwise noted.      Objective    BP (!) 140/92   Pulse 58   Temp 97.9  F (36.6  C) (Temporal)   Resp 15   Ht 1.775 m (5' 9.88\")   Wt 93 kg (205 lb)   SpO2 99%   BMI 29.51 kg/m    Body mass index is 29.51 kg/m .  Physical Exam   GENERAL: alert and no distress  NEURO: Normal strength and tone, mentation intact and speech normal  PSYCH: mentation appears normal, affect normal/bright    Office Visit on 04/01/2025   Component Date Value Ref Range Status    Uric Acid 04/01/2025 6.9  3.4 - 7.0 mg/dL Final    TSH 04/01/2025 2.55  0.30 - 4.20 uIU/mL Final    WBC Count 04/01/2025 4.6  4.0 - 11.0 10e3/uL Final    RBC Count 04/01/2025 4.90  4.40 - 5.90 10e6/uL Final    Hemoglobin 04/01/2025 16.0  13.3 - 17.7 g/dL Final    Hematocrit 04/01/2025 44.0  40.0 - 53.0 % Final    MCV 04/01/2025 90  78 - 100 fL Final    MCH 04/01/2025 32.7  26.5 - 33.0 pg Final    MCHC 04/01/2025 36.4  31.5 - 36.5 g/dL Final    RDW 04/01/2025 11.8  10.0 - 15.0 % Final    Platelet Count 04/01/2025 142 (L)  150 - 450 10e3/uL Final    Sodium 04/01/2025 138  135 - 145 mmol/L Final    Potassium 04/01/2025 4.7  3.4 - 5.3 mmol/L Final    Carbon Dioxide (CO2) 04/01/2025 30 (H)  22 - 29 mmol/L Final    Anion Gap 04/01/2025 8  7 - 15 mmol/L Final    Urea Nitrogen 04/01/2025 12.8  6.0 - 20.0 mg/dL Final    Creatinine 04/01/2025 0.84  0.67 - 1.17 mg/dL Final    GFR Estimate 04/01/2025 >90  >60 mL/min/1.73m2 Final    eGFR calculated using 2021 CKD-EPI equation.    Calcium 04/01/2025 9.7  8.8 - " 10.4 mg/dL Final    Chloride 04/01/2025 100  98 - 107 mmol/L Final    Glucose 04/01/2025 111 (H)  70 - 99 mg/dL Final    Alkaline Phosphatase 04/01/2025 62  40 - 150 U/L Final    AST 04/01/2025 26  0 - 45 U/L Final    ALT 04/01/2025 34  0 - 70 U/L Final    Protein Total 04/01/2025 7.3  6.4 - 8.3 g/dL Final    Albumin 04/01/2025 5.0  3.5 - 5.2 g/dL Final    Bilirubin Total 04/01/2025 0.7  <=1.2 mg/dL Final    Patient Fasting > 8hrs? 04/01/2025 Yes   Final    Cholesterol 04/01/2025 190  <200 mg/dL Final    Triglycerides 04/01/2025 335 (H)  <150 mg/dL Final    Direct Measure HDL 04/01/2025 45  >=40 mg/dL Final    LDL Cholesterol Calculated 04/01/2025 78  <100 mg/dL Final    Non HDL Cholesterol 04/01/2025 145 (H)  <130 mg/dL Final    Patient Fasting > 8hrs? 04/01/2025 Yes   Final    Prostate Specific Antigen Screen 04/01/2025 1.14  0.00 - 3.50 ng/mL Final           Signed Electronically by: RICKEY Jacobs CNP

## 2025-07-16 ENCOUNTER — ALLIED HEALTH/NURSE VISIT (OUTPATIENT)
Dept: FAMILY MEDICINE | Facility: CLINIC | Age: 55
End: 2025-07-16
Payer: COMMERCIAL

## 2025-07-16 VITALS — SYSTOLIC BLOOD PRESSURE: 122 MMHG | DIASTOLIC BLOOD PRESSURE: 78 MMHG

## 2025-07-16 DIAGNOSIS — I10 HYPERTENSION GOAL BP (BLOOD PRESSURE) < 140/90: Primary | ICD-10-CM

## 2025-07-16 NOTE — PROGRESS NOTES
I met with Serafin Hurd at the request of Sabrina Mojica to recheck his blood pressure.  Blood pressure medications on the med list were reviewed with patient.    Patient has taken all medications as per usual regimen: Yes  Patient reports tolerating them without any issues or concerns: Yes    Vitals:    07/16/25 1048   BP: 122/78       Blood pressure was taken, previous encounter was reviewed, recorded blood pressure below 140/90.  Patient was discharged and the note will be sent to the provider for final review.

## 2025-07-21 ENCOUNTER — TELEPHONE (OUTPATIENT)
Dept: FAMILY MEDICINE | Facility: OTHER | Age: 55
End: 2025-07-21
Payer: COMMERCIAL

## 2025-07-21 NOTE — TELEPHONE ENCOUNTER
Patient is coming in on Wednesday to get the covid vaccine he would also like the pneumococcal vaccine. Can you please place the order of which pneumococcal vaccine you would miguel a him to get?    Fannie Nolan MA 7/21/2025

## 2025-07-28 ENCOUNTER — TELEPHONE (OUTPATIENT)
Dept: FAMILY MEDICINE | Facility: OTHER | Age: 55
End: 2025-07-28
Payer: COMMERCIAL

## 2025-07-28 NOTE — TELEPHONE ENCOUNTER
FYI - Status Update    Who is Calling: patient    Update: Patient's daughter is having a baby and would like to know if he is up to date on all vaccines for having a new baby in his house     Does caller want a call/response back: Yes     Could we send this information to you in [place] or would you prefer to receive a phone call?:   Patient would prefer a phone call   Okay to leave a detailed message?: Yes at Cell number on file:    Telephone Information:   Mobile [phone]

## 2025-07-28 NOTE — TELEPHONE ENCOUNTER
Called and spoke to patient, daughter is due in 3 weeks. Patient is due for PCV20 and COVID. UTD on all other vaccinations. Was going to be seen last week, but said he was UTD on COVID, wasn't aware of the Pneumoccal. [facility] did place that order last week. Stated that some providers/patients are pushing COVID out until Fall but it is entirely up to him.   Scheduled patient for MA visit in [place] for Friday.

## (undated) DEVICE — DRSG KERLIX FLUFFS X5

## (undated) DEVICE — SOL WATER IRRIG 1000ML BOTTLE 2F7114

## (undated) DEVICE — SU CHROMIC 3-0 SH 27" G122H

## (undated) DEVICE — SOL NACL 0.9% IRRIG 1000ML BOTTLE 07138-09

## (undated) DEVICE — KIT ENDO TURNOVER/PROCEDURE CARRY-ON 101822

## (undated) DEVICE — PACK MINOR PROCEDURE CUSTOM

## (undated) DEVICE — PREP SKIN SCRUB TRAY 4461A

## (undated) DEVICE — ESU ELEC BLADE 2.75" COATED/INSULATED E1455

## (undated) DEVICE — DRAIN PENROSE 1/4X12" LATEX

## (undated) DEVICE — SUPPORTER ATHLETIC LG LATEX 202636

## (undated) DEVICE — SU VICRYL 2-0 SH 27" J317H

## (undated) DEVICE — SU VICRYL 3-0 SH 27" UND J416H

## (undated) DEVICE — TUBING SUCTION 6"X3/16" N56A

## (undated) DEVICE — DRAPE LAP PEDS 89209

## (undated) DEVICE — GLOVE ESTEEM POWDER FREE 7.0  2D72PL70

## (undated) RX ORDER — BUPIVACAINE HYDROCHLORIDE 2.5 MG/ML
INJECTION, SOLUTION EPIDURAL; INFILTRATION; INTRACAUDAL
Status: DISPENSED
Start: 2019-08-08

## (undated) RX ORDER — GINSENG 100 MG
CAPSULE ORAL
Status: DISPENSED
Start: 2019-08-08

## (undated) RX ORDER — FENTANYL CITRATE 50 UG/ML
INJECTION, SOLUTION INTRAMUSCULAR; INTRAVENOUS
Status: DISPENSED
Start: 2019-08-08

## (undated) RX ORDER — LIDOCAINE HYDROCHLORIDE 10 MG/ML
INJECTION, SOLUTION EPIDURAL; INFILTRATION; INTRACAUDAL; PERINEURAL
Status: DISPENSED
Start: 2019-08-08

## (undated) RX ORDER — LIDOCAINE HYDROCHLORIDE 20 MG/ML
INJECTION, SOLUTION EPIDURAL; INFILTRATION; INTRACAUDAL; PERINEURAL
Status: DISPENSED
Start: 2019-08-08

## (undated) RX ORDER — PROPOFOL 10 MG/ML
INJECTION, EMULSION INTRAVENOUS
Status: DISPENSED
Start: 2019-08-08